# Patient Record
Sex: FEMALE | Race: WHITE | NOT HISPANIC OR LATINO | Employment: FULL TIME | URBAN - METROPOLITAN AREA
[De-identification: names, ages, dates, MRNs, and addresses within clinical notes are randomized per-mention and may not be internally consistent; named-entity substitution may affect disease eponyms.]

---

## 2017-05-31 ENCOUNTER — TELEPHONE (OUTPATIENT)
Dept: NURSING | Facility: CLINIC | Age: 37
End: 2017-05-31

## 2017-05-31 NOTE — TELEPHONE ENCOUNTER
"W/o knowing what the virus is I'm not sure what to tell her. Usually viruses that are common in kids and \"just run its course\" are not concerning for a new pregnancy unless it's parvovirus (slapped cheek), measles, CMV. My guess is that her mari sores are causing change in latch d/t pain and a blister or split in her nipple occurred. Could certainly come in and be seen for eval but it's going to be very difficult to know regarding pregnancy if we don't know what the child's virus is. If preganncy is viable at 8 weeks it's usually a good sign b/c viruses this early in pregnancy are all or none in terms of effect. Usually no issues at all though  "

## 2017-05-31 NOTE — TELEPHONE ENCOUNTER
So that's cold sores that her child has. I don't think i've ever seen a cold sore on a nipple. Isn't impossible I suppose but very unlikely. The only way that hsv-1 can impact her baby is if she had a vaginal outbreak of herpes at time of delivery. Ow it's not of concern as up to 30% of people carry cold sore virus and chances are she herself does already

## 2017-05-31 NOTE — TELEPHONE ENCOUNTER
LMP 4/23/17. 5w3d.  Still nursing second child who delivered 2/13/16. Child seen by peds and has virus that has caused sores in mouth. Pt states not hand foot and mouth disease. Pt has now developed a sore on her left nipple that is now bleeding and is worried that virus could cause problems for her pregnancy. Pt has no other symptoms other than sore. Peds stated virus just had to run its course, no treatment. Routing to Dr. Silva. Please advise.

## 2017-05-31 NOTE — TELEPHONE ENCOUNTER
Pt informed. Pt called peds and was told the virus is Herpes simplex type 1. Pt was given the following suggests regarding the sore nipple  try mothers love cream, place breast milk on the nipple, air dry the nipple. Working the latch by using a different position to nurse and to pressure on a different area. Pt will call back if she wants to be seen. Routing to Dr. Silva for review.

## 2017-06-16 ENCOUNTER — PRENATAL OFFICE VISIT (OUTPATIENT)
Dept: OBGYN | Facility: CLINIC | Age: 37
End: 2017-06-16
Payer: COMMERCIAL

## 2017-06-16 ENCOUNTER — RADIANT APPOINTMENT (OUTPATIENT)
Dept: ULTRASOUND IMAGING | Facility: CLINIC | Age: 37
End: 2017-06-16
Attending: OBSTETRICS & GYNECOLOGY
Payer: COMMERCIAL

## 2017-06-16 VITALS
HEART RATE: 104 BPM | BODY MASS INDEX: 19.79 KG/M2 | DIASTOLIC BLOOD PRESSURE: 70 MMHG | SYSTOLIC BLOOD PRESSURE: 115 MMHG | HEIGHT: 69 IN | WEIGHT: 133.6 LBS

## 2017-06-16 DIAGNOSIS — R87.810 ASCUS WITH POSITIVE HIGH RISK HPV CERVICAL: ICD-10-CM

## 2017-06-16 DIAGNOSIS — Z34.91 UNCERTAIN DATES, ANTEPARTUM, FIRST TRIMESTER: ICD-10-CM

## 2017-06-16 DIAGNOSIS — Z36.9 ENCOUNTER FOR ANTENATAL SCREENING OF MOTHER: ICD-10-CM

## 2017-06-16 DIAGNOSIS — O09.521 HIGH-RISK PREGNANCY, ELDERLY MULTIGRAVIDA IN FIRST TRIMESTER: Primary | ICD-10-CM

## 2017-06-16 DIAGNOSIS — R87.610 ASCUS WITH POSITIVE HIGH RISK HPV CERVICAL: ICD-10-CM

## 2017-06-16 PROBLEM — O09.529 HIGH-RISK PREGNANCY, ELDERLY MULTIGRAVIDA: Status: ACTIVE | Noted: 2017-06-16

## 2017-06-16 LAB
ABO + RH BLD: NORMAL
ABO + RH BLD: NORMAL
ALBUMIN UR-MCNC: NEGATIVE MG/DL
APPEARANCE UR: CLEAR
BACTERIA #/AREA URNS HPF: ABNORMAL /HPF
BASOPHILS # BLD AUTO: 0 10E9/L (ref 0–0.2)
BASOPHILS NFR BLD AUTO: 0.3 %
BILIRUB UR QL STRIP: NEGATIVE
BLD GP AB SCN SERPL QL: NORMAL
BLOOD BANK CMNT PATIENT-IMP: NORMAL
COLOR UR AUTO: YELLOW
DIFFERENTIAL METHOD BLD: NORMAL
EOSINOPHIL # BLD AUTO: 0.1 10E9/L (ref 0–0.7)
EOSINOPHIL NFR BLD AUTO: 1.5 %
ERYTHROCYTE [DISTWIDTH] IN BLOOD BY AUTOMATED COUNT: 12.5 % (ref 10–15)
GLUCOSE UR STRIP-MCNC: NEGATIVE MG/DL
HCT VFR BLD AUTO: 36.1 % (ref 35–47)
HGB BLD-MCNC: 12.1 G/DL (ref 11.7–15.7)
HGB UR QL STRIP: ABNORMAL
KETONES UR STRIP-MCNC: NEGATIVE MG/DL
LEUKOCYTE ESTERASE UR QL STRIP: NEGATIVE
LYMPHOCYTES # BLD AUTO: 1.7 10E9/L (ref 0.8–5.3)
LYMPHOCYTES NFR BLD AUTO: 19.1 %
MCH RBC QN AUTO: 30.3 PG (ref 26.5–33)
MCHC RBC AUTO-ENTMCNC: 33.5 G/DL (ref 31.5–36.5)
MCV RBC AUTO: 90 FL (ref 78–100)
MONOCYTES # BLD AUTO: 0.6 10E9/L (ref 0–1.3)
MONOCYTES NFR BLD AUTO: 6.2 %
MUCOUS THREADS #/AREA URNS LPF: PRESENT /LPF
NEUTROPHILS # BLD AUTO: 6.5 10E9/L (ref 1.6–8.3)
NEUTROPHILS NFR BLD AUTO: 72.9 %
NITRATE UR QL: NEGATIVE
NON-SQ EPI CELLS #/AREA URNS LPF: ABNORMAL /LPF
PH UR STRIP: 6 PH (ref 5–7)
PLATELET # BLD AUTO: 272 10E9/L (ref 150–450)
RBC # BLD AUTO: 4 10E12/L (ref 3.8–5.2)
RBC #/AREA URNS AUTO: ABNORMAL /HPF (ref 0–2)
SP GR UR STRIP: 1.02 (ref 1–1.03)
SPECIMEN EXP DATE BLD: NORMAL
URN SPEC COLLECT METH UR: ABNORMAL
UROBILINOGEN UR STRIP-ACNC: 0.2 EU/DL (ref 0.2–1)
WBC # BLD AUTO: 8.9 10E9/L (ref 4–11)
WBC #/AREA URNS AUTO: ABNORMAL /HPF (ref 0–2)

## 2017-06-16 PROCEDURE — 87389 HIV-1 AG W/HIV-1&-2 AB AG IA: CPT | Performed by: OBSTETRICS & GYNECOLOGY

## 2017-06-16 PROCEDURE — 86901 BLOOD TYPING SEROLOGIC RH(D): CPT | Performed by: OBSTETRICS & GYNECOLOGY

## 2017-06-16 PROCEDURE — 81001 URINALYSIS AUTO W/SCOPE: CPT | Performed by: OBSTETRICS & GYNECOLOGY

## 2017-06-16 PROCEDURE — 86762 RUBELLA ANTIBODY: CPT | Performed by: OBSTETRICS & GYNECOLOGY

## 2017-06-16 PROCEDURE — 87340 HEPATITIS B SURFACE AG IA: CPT | Performed by: OBSTETRICS & GYNECOLOGY

## 2017-06-16 PROCEDURE — 86780 TREPONEMA PALLIDUM: CPT | Performed by: OBSTETRICS & GYNECOLOGY

## 2017-06-16 PROCEDURE — 86850 RBC ANTIBODY SCREEN: CPT | Performed by: OBSTETRICS & GYNECOLOGY

## 2017-06-16 PROCEDURE — 87624 HPV HI-RISK TYP POOLED RSLT: CPT | Performed by: OBSTETRICS & GYNECOLOGY

## 2017-06-16 PROCEDURE — 76817 TRANSVAGINAL US OBSTETRIC: CPT | Performed by: OBSTETRICS & GYNECOLOGY

## 2017-06-16 PROCEDURE — 85025 COMPLETE CBC W/AUTO DIFF WBC: CPT | Performed by: OBSTETRICS & GYNECOLOGY

## 2017-06-16 PROCEDURE — 99207 ZZC FIRST OB VISIT: CPT | Performed by: OBSTETRICS & GYNECOLOGY

## 2017-06-16 PROCEDURE — 88141 CYTOPATH C/V INTERPRET: CPT | Performed by: OBSTETRICS & GYNECOLOGY

## 2017-06-16 PROCEDURE — 86900 BLOOD TYPING SEROLOGIC ABO: CPT | Performed by: OBSTETRICS & GYNECOLOGY

## 2017-06-16 PROCEDURE — 88175 CYTOPATH C/V AUTO FLUID REDO: CPT | Performed by: OBSTETRICS & GYNECOLOGY

## 2017-06-16 PROCEDURE — 87086 URINE CULTURE/COLONY COUNT: CPT | Performed by: OBSTETRICS & GYNECOLOGY

## 2017-06-16 PROCEDURE — 36415 COLL VENOUS BLD VENIPUNCTURE: CPT | Performed by: OBSTETRICS & GYNECOLOGY

## 2017-06-16 ASSESSMENT — PATIENT HEALTH QUESTIONNAIRE - PHQ9: 5. POOR APPETITE OR OVEREATING: NOT AT ALL

## 2017-06-16 ASSESSMENT — ANXIETY QUESTIONNAIRES
3. WORRYING TOO MUCH ABOUT DIFFERENT THINGS: NOT AT ALL
6. BECOMING EASILY ANNOYED OR IRRITABLE: NOT AT ALL
7. FEELING AFRAID AS IF SOMETHING AWFUL MIGHT HAPPEN: NOT AT ALL
2. NOT BEING ABLE TO STOP OR CONTROL WORRYING: NOT AT ALL
GAD7 TOTAL SCORE: 0
1. FEELING NERVOUS, ANXIOUS, OR ON EDGE: NOT AT ALL
5. BEING SO RESTLESS THAT IT IS HARD TO SIT STILL: NOT AT ALL

## 2017-06-16 NOTE — NURSING NOTE
[facility] Obstetrical Risk History    Patient presents for new OB labs and teaching.      1. Please indicate any condition you have or have had in the past:  Herpes  2. Do you smoke?  No  If yes, how many packs/day?   3. Do you drink alcoholic beverages? No  If yes, how often?  What type?   4. List any medications taken since your last period: Prenatal Vitamin  5. List any recreational drugs (cocaine, marijuana, etc. used since your last period:None    6. List any chemical or radiation exposure that you've encountered: None  7. Are you on a restricted diet? No  If yes, please describe:  Do you have any Taoism objections to any form of treatment? No      GENETIC SCREENING    These questions apply to you, the baby's father, or anyone in either family with:    1. Patient's age 35 or greater at delivery? Yes  2. Surinamese, Syriac, Mediterranean ancestry? No  3. Neural Tube Defect (Meningomyelocele, Spina Bifida, or Anencephaly)? No  4. Tenriism, Hungarian Barbadian or history of Shawn-Sachs disease? No  5. Down's Syndrome?   No  6. Hemophilia or clotting disorder? No  7. Muscular Dystrophy? No  8. Cystic Fibrosis? No  9. [place]'s Chorea? No  10. Mental Retardation? No  11. 3 or more miscarriages or a stillborn? No  12. Other inherited disease or chromosomal disorder? No  13. Have you or the baby's father had a child born with a birth defect? No  14. Did you or the baby's father have a birth defect yourselves? No    Do you have any other concerns about birth defects? No      Prior to finding out is pregnant went to the chiropractor and had STEM treatment done twice.      -Third pregnancy; is currently breastfeeding.  -Pt did get a flu shot this season.  -Last annual exam was her postpartum visit which was on 3/29/2016.  -Talked pt and her  about genetic testing/genetic screening. Gave Innatal brochure and discussed what it is. Pt aware to get done Innatal done has to be a minimum of 10 wks pregnant. Pt did  not indicate if she wanted to have Innatal done or not.

## 2017-06-16 NOTE — PROGRESS NOTES
This is a 36 year old female patient,   who presents for her first obstetrical visit.    Patient's last menstrual period was 2017 (exact date)..  This gives her an EDC of 2018 .  She is 7w5d weeks.  Her cycles are irregular.  Her last menstrual period was normal.  She has not had an ultrasound prior to the visit..  Since her LMP, she has experienced  nausea, emesis and fatigue).  She denies abdominal pain, headache, loss of appetite, vaginal discharge, dysuria, pelvic pain, urinary urgency, lightheadedness, urinary frequency, vaginal bleeding, hemorrhoids and constipation.    Prior pregs uncomplicated. Emesis qday.       Past History:  Her past medical history   Past Medical History:   Diagnosis Date     History of cold sores     Orally only. Did have one on her breast as well.     HPV (human papilloma virus) infection      Threatened       Vaginal Pap smear with LGSIL      Vaginitis    .      Her past pregnancies have been uncomplicated.    Since her last LMP she denies use of alcohol, tobacco and street drugs.    HISTORY:  Obstetric History       T2      L2     SAB0   TAB0   Ectopic0   Multiple0   Live Births1       # Outcome Date GA Lbr Reggie/2nd Weight Sex Delivery Anes PTL Lv   3 Current            2 Term 16 37w6d 03:27 / 00:15 8 lb 3.9 oz (3.74 kg) F   N MATTHEW      Apgar1:  8                Apgar5: 9   1 Term 14 39w0d 07:15 / 05:12 6 lb 13.4 oz (3.1 kg) F Vag-Spont EPI  MATTHEW      Name: LANA DONAHUE      Apgar1:  9                Apgar5: 9        Past Medical History:   Diagnosis Date     History of cold sores     Orally only. Did have one on her breast as well.     HPV (human papilloma virus) infection      Threatened       Vaginal Pap smear with LGSIL      Vaginitis      Past Surgical History:   Procedure Laterality Date     COLPOSCOPY CERVIX, BIOPSY CERVIX, ENDOCERVICAL CURETTAGE, COMBINED  2012    bx and ECG neg after initial LGSIL ,  "+ HPV     HC REMOVAL OF OVARIAN CYST(S)  1996     LAPAROSCOPIC APPENDECTOMY CHILD  2000     wisdom teeth       Family History   Problem Relation Age of Onset     Hyperlipidemia Father      Parkinsonism Father      Hyperlipidemia Maternal Grandfather      Hyperlipidemia Paternal Grandmother      Hypertension Paternal Grandmother      Social History     Social History     Marital status:      Spouse name: Ashley     Number of children: 1     Years of education: N/A     Social History Main Topics     Smoking status: Never Smoker     Smokeless tobacco: Never Used     Alcohol use No     Drug use: None     Sexual activity: Yes     Partners: Male     Birth control/ protection: None      Comment: premenopausal, LMP 5-     Other Topics Concern     None     Social History Narrative     Current Outpatient Prescriptions   Medication Sig     Prenatal Vit-Fe Fumarate-FA (PRENATAL MULTIVITAMIN  PLUS IRON) 27-0.8 MG TABS Take 1 tablet by mouth daily     No current facility-administered medications for this visit.      Facility-Administered Medications Ordered in Other Visits   Medication     ROPivacaine (NAROPIN) epidural     fentaNYL (SUBLIMAZE) injection     No Known Allergies    Past medical, surgical, social and family history were reviewed and updated in EPIC.    ROS:   12 point review of systems negative other than symptoms noted below.  Constitutional: Fatigue  Gastrointestinal: Nausea and Vomiting    EXAM:  /70  Pulse 104  Ht 5' 8.5\" (1.74 m)  Wt 133 lb 9.6 oz (60.6 kg)  LMP 04/23/2017 (Exact Date)  Breastfeeding? Yes  BMI 20.02 kg/m2   BMI: Body mass index is 20.02 kg/(m^2).    EXAM:  Constitutional:  Appearance: Well nourished, well developed alert, in no acute distress.  Neck:   Lymph Nodes:  No lymphadenopathy present.    Thyroid:  Gland size normal, nontender, no nodules or masses present  on palpation.  Chest:  Respiratory Effort:  Breathing unlabored.  Cardiovascular:  Heart Auscultation:  " Regular rate, normal rhythm, no murmurs    present.  Breasts: Deferred.    Axillary Lymph Nodes:  No lymphadenopathy present.  Gastrointestinal:  Abdominal Examination:  Abdomen nontender to palpation, tone  normal without rigidity or guarding, no masses present, umbilicus without  Lesions.    Liver and speen:  No hepatomegaly present, liver nontender to  palpation.    Hernias:  No hernias present.  Lymphatic: Lymph Nodes:  No other lymphadenopathy present.  Skin:  General Inspection:  No rashes present, no lesions present, no areas of  discoloration.    Genitalia and Groin:  No rashes present, no lesions present, no areas of  discoloration, no masses present.  Neurologic/Psychiatric:    Mental Status:  Oriented X3.    Pelvic Exam:  External Genitalia:     Normal appearance for age, no discharge present, no tenderness present, no inflammatory lesions present, color normal  Vagina:     Normal vaginal vault without central or paravaginal defects, no discharge present, no inflammatory lesions present, no masses present  Bladder:     Nontender to palpation  Urethra:   Urethral Body:  Urethra palpation normal, urethra structural support normal   Urethral Meatus:  No erythema or lesions present  Cervix:     Appearance healthy, no lesions present, nontender to palpation, no bleeding present  Uterus:     Uterus: firm, normal sized and nontender, retroverted in position.   Adnexa:     No adnexal tenderness present, no adnexal masses present  Perineum:     Perineum within normal limits, no evidence of trauma, no rashes or skin lesions present  Anus:     Anus within normal limits, no hemorrhoids present  Inguinal Lymph Nodes:     No lymphadenopathy present  Pubic Hair:     Normal pubic hair distribution for age  Genitalia and Groin:     No rashes present, no lesions present, no areas of discoloration, no masses present  Bedside u/s shows single IUP at 6+4 weeks.     ASSESSMENT:      ICD-10-CM    1. High-risk pregnancy, elderly  multigravida in first trimester O09.521    2. Encounter for  screening of mother Z36 UA with Microscopic     ABO/Rh type and screen     Anti Treponema     CBC with platelets differential     Hepatitis B surface antigen     HIV Antigen Antibody Combo     Rubella Antibody IgG Quantitative     Urine Culture Aerobic Bacterial   3. Uncertain dates, antepartum, first trimester Z34.91 US OB Transvaginal Only   4. ASCUS with positive high risk HPV cervical R87.610 Pap imaged thin layer screen with HPV - recommended age 30 - 65    R87.810 HPV High Risk Types DNA Cervical       PLAN/PATIENT INSTRUCTIONS:    Will decide about Genetic testing.   Get formal u/s to determine EDC.    Javi Pierson MD

## 2017-06-16 NOTE — MR AVS SNAPSHOT
After Visit Summary   2017    Ashlee Acevedo    MRN: [id number]           Patient Information     Date Of Birth          1980        Visit Information        Provider Department      2017 8:30 AM Javi Pierson MD; [facility]        Today's Diagnoses     High-risk pregnancy, elderly multigravida in first trimester    -  1    Encounter for  screening of mother        Uncertain dates, antepartum, first trimester        ASCUS with positive high risk HPV cervical           Follow-ups after your visit        Your next 10 appointments already scheduled     2017  9:45 AM CDT   US OB TRANSVAGINAL ONLY with WEUS2   [facility] ([facility]    [address] [place] [id number] [phone]           Please bring a list of your medicines (including vitamins, minerals and over-the-counter drugs). Also, tell your doctor about any allergies you may have. Wear comfortable clothes and leave your valuables at home.  If you re less than 20 weeks drink four 8-ounce glasses of fluid an hour before your exam. If you need to empty your bladder before your exam, try to release only a little urine. Then, drink another glass of fluid.  You may have up to two family members in the exam room. If you bring a small child, an adult must be there to care for him or her.  Please call the Imaging Department at your exam site with any questions.              Who to contact     If you have questions or need follow up information about today's clinic visit or your schedule please contact [facility] directly at [phone].  Normal or non-critical lab and imaging results will be communicated to you by MyChart, letter or phone within 4 business days after the clinic has received the results. If you do not hear from us within 7 days, please contact the clinic through MyChart or phone.  "If you have a critical or abnormal lab result, we will notify you by phone as soon as possible.  Submit refill requests through [employer] or call your pharmacy and they will forward the refill request to us. Please allow 3 business days for your refill to be completed.          Additional Information About Your Visit        [employer] Information     [employer] lets you send messages to your doctor, view your test results, renew your prescriptions, schedule appointments and more. To sign up, go to [email or web address] . Click on \"Log in\" on the left side of the screen, which will take you to the Welcome page. Then click on \"Sign up Now\" on the right side of the page.     You will be asked to enter the access code listed below, as well as some personal information. Please follow the directions to create your username and password.     Your access code is: [email or web address]  Expires: 2017  9:37 AM     Your access code will  in 90 days. If you need help or a new code, please call your [facility] clinic or [phone].        Care EveryWhere ID     This is your Care EveryWhere ID. This could be used by other organizations to access your [facility] medical records  [id number]        Your Vitals Were     Pulse Height Last Period Breastfeeding? BMI (Body Mass Index)       104 5' 8.5\" (1.74 m) 2017 (Exact Date) Yes 20.02 kg/m2        Blood Pressure from Last 3 Encounters:   17 115/70   16 98/62   16 100/54    Weight from Last 3 Encounters:   17 133 lb 9.6 oz (60.6 kg)   16 144 lb (65.3 kg)   16 146 lb (66.2 kg)              We Performed the Following     ABO/Rh type and screen     Anti Treponema     CBC with platelets differential     Hepatitis B surface antigen     HIV Antigen Antibody Combo     HPV High Risk Types DNA Cervical     Pap imaged thin layer screen with HPV - recommended age 30 - 65     Rubella Antibody IgG Quantitative     UA with Microscopic     Urine Culture Aerobic " [facility]        Primary Care Provider Office Phone # Fax #    Javi Pierson [phone]       [facility] [address] [place] [id number]        Thank you!     Thank you for choosing [facility]  for your care. Our goal is always to provide you with excellent care. Hearing back from our patients is one way we can continue to improve our services. Please take a few minutes to complete the written survey that you may receive in the mail after your visit with us. Thank you!             Your Updated Medication List - Protect others around you: Learn how to safely use, store and throw away your medicines at www.disposemymeds.org.          This list is accurate as of: 6/16/17  9:37 AM.  Always use your most recent med list.                   Brand Name Dispense Instructions for use    prenatal multivitamin  plus iron 27-0.8 MG Tabs per tablet     100 tablet    Take 1 tablet by mouth daily

## 2017-06-17 LAB
BACTERIA SPEC CULT: NO GROWTH
Lab: NORMAL
MICRO REPORT STATUS: NORMAL
SPECIMEN SOURCE: NORMAL
T PALLIDUM IGG+IGM SER QL: NEGATIVE

## 2017-06-17 ASSESSMENT — PATIENT HEALTH QUESTIONNAIRE - PHQ9: SUM OF ALL RESPONSES TO PHQ QUESTIONS 1-9: 0

## 2017-06-17 ASSESSMENT — ANXIETY QUESTIONNAIRES: GAD7 TOTAL SCORE: 0

## 2017-06-19 LAB
HBV SURFACE AG SERPL QL IA: NONREACTIVE
HIV 1+2 AB+HIV1 P24 AG SERPL QL IA: NORMAL
RUBV IGG SERPL IA-ACNC: 16 IU/ML

## 2017-06-21 LAB
COPATH REPORT: ABNORMAL
PAP: ABNORMAL

## 2017-06-22 LAB
FINAL DIAGNOSIS: ABNORMAL
HPV HR 12 DNA CVX QL NAA+PROBE: POSITIVE
HPV16 DNA SPEC QL NAA+PROBE: NEGATIVE
HPV18 DNA SPEC QL NAA+PROBE: NEGATIVE
SPECIMEN DESCRIPTION: ABNORMAL

## 2017-06-23 ENCOUNTER — RESULT FOLLOW UP (OUTPATIENT)
Dept: OBGYN | Facility: CLINIC | Age: 37
End: 2017-06-23

## 2017-06-23 DIAGNOSIS — R87.610 ASCUS WITH POSITIVE HIGH RISK HPV CERVICAL: ICD-10-CM

## 2017-06-23 DIAGNOSIS — R87.810 ASCUS WITH POSITIVE HIGH RISK HPV CERVICAL: ICD-10-CM

## 2017-06-23 NOTE — PROGRESS NOTES
03/29/16 ASCUS, +HR HPV.   04/07/16 [facility] -JENIFFER 1. Plan 1 yr co-test  06/16/17 ASCUS, +HR HPV, pregnant. Plan repeat pap post partum, due date 02/06/18 06/26/17: I called the pt and informed her of the pap and HPV results and to repeat this 6 weeks post partum per her provider. (sk)  3/20/18 NIL pap/+ HR HPV (not 16/18). Plan: colposcopy by 6/20/18 (hospitals)  3/26/18 advised of result and follow up (hospitals)  05/23/18 [facility] reminder letter sent. ([facility])  6/22/18 Colpo- Normal ECC. Plan: pap and HPV in a year. (hospitals)  06/07/19 [facility] reminder letter sent. ([facility])  6/24/19 NIL pap, + HR HPV (not 16/18).  Plan: colpo (hospitals)  7/1/19 left msg/Advised of result and follow up. (hospitals)  9/6/19 Colpo: ECC-neg.  Plan: [facility] in 1 year (hospitals)

## 2017-06-23 NOTE — LETTER
June 7, 2019      Ashlee Acevedo  [address] [address]    Dear ,      At [facility], your health and wellness is our primary concern. That is why we are following up on a colposcopy from 06/22/18. Your provider had recommended that you have a Pap smear and HPV test completed by 06/22/19. Our records do not show that this has been scheduled.    It is important to complete the follow up that your provider has suggested for you to ensure that there are no worsening changes which may, over time, develop into cancer.      Please contact our office at  [phone] to schedule an appointment for a Pap smear and HPV test at your earliest convenience. If you have questions or concerns, please call the clinic and we will be happy to assist you.    If you have completed the tests outside of [facility], please have the results forwarded to our office. We will update the chart for your primary Physician to review before your next annual physical.     Thank you for choosing [facility]!    Sincerely,      Your [facility] Care Team/[facility]

## 2017-06-23 NOTE — LETTER
May 23, 2018      Ashlee Acevedo  [address] [address]    Dear MsCorazon,      At [facility], your health and wellness is our primary concern. That is why we are following up on a positive high risk HPV test from 03/20/18. Your provider had recommended that you have a Colposcopy completed by 06/20/18. Our records do not show that this has been scheduled.    It is important to complete the follow up that your provider has suggested for you to ensure that there are no worsening changes which may, over time, develop into cancer.      Please contact our office at  [phone] to schedule an appointment for a Colposcopy at your earliest convenience. If you have questions or concerns, please call the clinic and we will be happy to assist you.    If you have completed the tests outside of [facility], please have the results forwarded to our office. We will update the chart for your primary Physician to review before your next annual physical.     Thank you for choosing [facility]!    Sincerely,      Javi Pierson MD/[facility]

## 2017-06-27 ENCOUNTER — TELEPHONE (OUTPATIENT)
Dept: OBGYN | Facility: CLINIC | Age: 37
End: 2017-06-27

## 2017-06-27 NOTE — TELEPHONE ENCOUNTER
Is about 8 wks - would like to discuss morning sickness she is having.  Please call.  She is still nursing her 1 year old, wants to know what is safe to take.

## 2017-06-27 NOTE — TELEPHONE ENCOUNTER
Spoke with Dr. Pierson who states vit B6/unisom or zofran is an option with BF. I also reviewed a lot of the non-medication options with patient, she would like to try these first. Patient does not even know how much milk the baby is getting as she has not let down in some time. She thinks it is just a comfort thing for her 16 mth old. Is aware of nutrition concerns with pregnancy and BF. She will call back if she continues not feeling well despite trying other options.

## 2017-07-02 ENCOUNTER — NURSE TRIAGE (OUTPATIENT)
Dept: NURSING | Facility: CLINIC | Age: 37
End: 2017-07-02

## 2017-07-03 ENCOUNTER — PRENATAL OFFICE VISIT (OUTPATIENT)
Dept: OBGYN | Facility: CLINIC | Age: 37
End: 2017-07-03
Payer: COMMERCIAL

## 2017-07-03 ENCOUNTER — TELEPHONE (OUTPATIENT)
Dept: NURSING | Facility: CLINIC | Age: 37
End: 2017-07-03

## 2017-07-03 VITALS — DIASTOLIC BLOOD PRESSURE: 70 MMHG | BODY MASS INDEX: 19.78 KG/M2 | SYSTOLIC BLOOD PRESSURE: 112 MMHG | WEIGHT: 132 LBS

## 2017-07-03 DIAGNOSIS — O21.9 NAUSEA AND VOMITING IN PREGNANCY: Primary | ICD-10-CM

## 2017-07-03 DIAGNOSIS — O09.521 HIGH-RISK PREGNANCY, ELDERLY MULTIGRAVIDA IN FIRST TRIMESTER: ICD-10-CM

## 2017-07-03 PROCEDURE — 99207 ZZC PRENATAL VISIT: CPT | Performed by: OBSTETRICS & GYNECOLOGY

## 2017-07-03 RX ORDER — ONDANSETRON 8 MG/1
8 TABLET, ORALLY DISINTEGRATING ORAL EVERY 8 HOURS PRN
Qty: 30 TABLET | Refills: 1 | Status: SHIPPED | OUTPATIENT
Start: 2017-07-03 | End: 2017-07-14

## 2017-07-03 NOTE — TELEPHONE ENCOUNTER
"8w6d; DAYO: 2/6/18  Called pt and states yesterday she was throwing up for 9 hrs straight. On-call yesterday (Dr. Pierson) stated it needed to run its course, per pt statement. Stopped throwing up last night at 10:00 PM. Woke up in middle of night and was sipping Gatorade. Today has been able to keep food down. Did not eat anything out of the ordinary. Gone to bathroom twice today. Both times urine was yellow and concentrated, first one in the morning was really dark yellow-\"almost orange.\" Denies dizziness/lightheadedhess, did have dry mouth in middle of night. Denies vaginal bleeding. Started this morning c/o mild, constant, menstrual like cramping, did have intercourse with the last two days. Also, c/o passing about quarter in size clear/white mucous.    Talked with Dr. Lobo and she recommended/gave verbal for Zofran 8 MG ODT, take 1 tab TID PRN, 30 tabs/1rf, it is a category C, we do prescribe it for patients but it has shown studies of things with baby. And seeing Dr. Pierson this wk to touch base. Not much we can do for the cramping, as long as she is not bleeding. Rx sent to pharmacy. Pt states she thinks she will hold off on Zofran for now as she is breastfeeding still. If she wants to pick it up she can, up to her. Discussed popsciles, ginger ale, jello. Reviewed if has vaginal bleeding, or if cramping intensifies, fever, chills, not able to tolerate food or liquid to let us know. Pt verbalized understanding.    Pt stated she wanted to be seen today for peace of mind and not waiting until later in wk. Appt available today at 3:50 PM with Dr. Lobo, scheduled appt.      Closing encounter.      "

## 2017-07-03 NOTE — MR AVS SNAPSHOT
"              After Visit Summary   7/3/2017    Ashlee Acevedo    MRN: [id number]           Patient Information     Date Of Birth          1980        Visit Information        Provider Department      7/3/2017 3:50 PM Shelli Lobo MD [facility]        Today's Diagnoses     High-risk pregnancy, elderly multigravida in first trimester           Follow-ups after your visit        Your next 10 appointments already scheduled     Jul 14, 2017  7:50 AM CDT   ESTABLISHED PRENATAL with Javi Pierson MD   [facility]    [address] [place] [id number] [phone]              Who to contact     If you have questions or need follow up information about today's clinic visit or your schedule please contact [facility] directly at [phone].  Normal or non-critical lab and imaging results will be communicated to you by MyChart, letter or phone within 4 business days after the clinic has received the results. If you do not hear from us within 7 days, please contact the clinic through MyChart or phone. If you have a critical or abnormal lab result, we will notify you by phone as soon as possible.  Submit refill requests through N[employer] or call your pharmacy and they will forward the refill request to us. Please allow 3 business days for your refill to be completed.          Additional Information About Your Visit        MyChart Information     [employer] lets you send messages to your doctor, view your test results, renew your prescriptions, schedule appointments and more. To sign up, go to [email or web address] . Click on \"Log in\" on the left side of the screen, which will take you to the Welcome page. Then click on \"Sign up Now\" on the right side of the page.     You will be asked to enter the access code listed below, as well as some personal information. Please follow the directions to " create your username and password.     Your access code is: [email or web address]  Expires: 2017  9:37 AM     Your access code will  in 90 days. If you need help or a new code, please call your [place] clinic or [phone].        Care EveryWhere ID     This is your Care EveryWhere ID. This could be used by other organizations to access your [place] medical records  [id number]        Your Vitals Were     Last Period BMI (Body Mass Index)                2017 (Exact Date) 19.78 kg/m2           Blood Pressure from Last 3 Encounters:   17 110/78   17 112/70   17 115/70    Weight from Last 3 Encounters:   17 132 lb (59.9 kg)   17 132 lb (59.9 kg)   17 133 lb 9.6 oz (60.6 kg)              Today, you had the following     No orders found for display         Today's Medication Changes          These changes are accurate as of: 7/3/17 11:59 PM.  If you have any questions, ask your nurse or doctor.               Start taking these medicines.        Dose/Directions    ondansetron 8 MG ODT tab   Commonly known as:  ZOFRAN-ODT   Used for:  Nausea and vomiting in pregnancy   Started by:  Javi Pierson MD        Dose:  8 mg   Take 1 tablet (8 mg) by mouth every 8 hours as needed for nausea   Quantity:  30 tablet   Refills:  1            Where to get your medicines      These medications were sent to [place] [address] [place] [address]  [address] [place] [address]     Phone:  [phone]     ondansetron 8 MG ODT tab                Primary Care Provider Office Phone # Fax #    Javi Pierson [phone]       [facility] [address] [address]        Equal Access to Services     [facility] YANELI AH: Siri burgesso Soanais, waaxda luqadaha, qaybta kaalmakarissa brownlee. [facility] [phone].    ATENCIÓN: Si jess adams, kita a abrams disposición  servicios gratuitos de asistencia lingüística. Len thomas [phone].    We comply with applicable federal civil rights laws and [place] laws. We do not discriminate on the basis of race, color, national origin, age, disability sex, sexual orientation or gender identity.            Thank you!     Thank you for choosing [facility] [place]  for your care. Our goal is always to provide you with excellent care. Hearing back from our patients is one way we can continue to improve our services. Please take a few minutes to complete the written survey that you may receive in the mail after your visit with us. Thank you!             Your Updated Medication List - Protect others around you: Learn how to safely use, store and throw away your medicines at www.disposemymeds.org.          This list is accurate as of: 7/3/17 11:59 PM.  Always use your most recent med list.                   Brand Name Dispense Instructions for use Diagnosis    ondansetron 8 MG ODT tab    ZOFRAN-ODT    30 tablet    Take 1 tablet (8 mg) by mouth every 8 hours as needed for nausea    Nausea and vomiting in pregnancy       prenatal multivitamin  plus iron 27-0.8 MG Tabs per tablet     100 tablet    Take 1 tablet by mouth daily

## 2017-07-03 NOTE — TELEPHONE ENCOUNTER
"Patient calling to report vomiting x 8 hours, not able to keep anything down. Vomiting at least 2 times an hour. Has had 3 diarrhea stools today. 8 weeks pregnant. Paged per clinic preferences.   Reason for Disposition    [1] SEVERE vomiting (e.g., 6 or more times/day) AND [2] present > 8 hours    Additional Information    Negative: Shock suspected (e.g., cold/pale/clammy skin, too weak to stand, low BP, rapid pulse)    Negative: Difficult to awaken or acting confused  (e.g., disoriented, slurred speech)    Negative: Sounds like a life-threatening emergency to the triager    Negative: Vomiting occurs only while coughing    Negative: [1] Pregnant < 20 Weeks AND [2] nausea/vomiting began in early pregnancy (i.e., 4-8 weeks pregnant)    Negative: Chest pain    Negative: [1] SEVERE headache AND [2] similar to prior migraines    Negative: [1] Vomiting AND [2] contains red blood or black (\"coffee ground\") material  (Exception: few red streaks in vomit that only happened once)    Negative: Severe pain in one eye    Negative: Recent head injury (within last 3 days)    Negative: Recent abdominal injury (within last 3 days)    Negative: [1] Insulin-dependent diabetes (Type I) AND [2] glucose > 400 mg/dl (22 mmol/l)    Protocols used: VOMITING-ADULT-    "

## 2017-07-05 ENCOUNTER — RADIANT APPOINTMENT (OUTPATIENT)
Dept: ULTRASOUND IMAGING | Facility: CLINIC | Age: 37
End: 2017-07-05
Payer: COMMERCIAL

## 2017-07-05 ENCOUNTER — PRENATAL OFFICE VISIT (OUTPATIENT)
Dept: OBGYN | Facility: CLINIC | Age: 37
End: 2017-07-05
Payer: COMMERCIAL

## 2017-07-05 VITALS — DIASTOLIC BLOOD PRESSURE: 78 MMHG | SYSTOLIC BLOOD PRESSURE: 110 MMHG | BODY MASS INDEX: 19.78 KG/M2 | WEIGHT: 132 LBS

## 2017-07-05 DIAGNOSIS — O36.80X0 ENCOUNTER TO DETERMINE FETAL VIABILITY OF PREGNANCY, NOT APPLICABLE OR UNSPECIFIED FETUS: ICD-10-CM

## 2017-07-05 DIAGNOSIS — O21.0 HYPEREMESIS AFFECTING PREGNANCY, ANTEPARTUM: Primary | ICD-10-CM

## 2017-07-05 PROCEDURE — 99207 ZZC PRENATAL VISIT: CPT | Performed by: OBSTETRICS & GYNECOLOGY

## 2017-07-05 PROCEDURE — 76817 TRANSVAGINAL US OBSTETRIC: CPT | Performed by: OBSTETRICS & GYNECOLOGY

## 2017-07-05 NOTE — PROGRESS NOTES
Patient seen last week due to anxiety about her pregnancy with a lot of nausea  Viability us repeated today and was normal so she is reassured  Red right eye with a lot of pain and light sensitivity, not itching    Needs eye md appt, to r/o iriritis since symptoms not typical for pink eye

## 2017-07-05 NOTE — MR AVS SNAPSHOT
"              After Visit Summary   7/5/2017    Ashlee Acevedo    MRN: [id number]           Patient Information     Date Of Birth          1980        Visit Information        Provider Department      7/5/2017 12:20 PM Shelli Lobo MD [facility]        Today's Diagnoses     Hyperemesis affecting pregnancy, antepartum    -  1       Follow-ups after your visit        Your next 10 appointments already scheduled     Jul 14, 2017  7:50 AM CDT   ESTABLISHED PRENATAL with Javi Pierson MD   [facility] ([facility]    [address] [place] [id number]   [phone]              Who to contact     If you have questions or need follow up information about today's clinic visit or your schedule please contact [facility] directly at [phone].  Normal or non-critical lab and imaging results will be communicated to you by MyChart, letter or phone within 4 business days after the clinic has received the results. If you do not hear from us within 7 days, please contact the clinic through MyChart or phone. If you have a critical or abnormal lab result, we will notify you by phone as soon as possible.  Submit refill requests through Loudr or call your pharmacy and they will forward the refill request to us. Please allow 3 business days for your refill to be completed.          Additional Information About Your Visit        MyChart Information     Loudr lets you send messages to your doctor, view your test results, renew your prescriptions, schedule appointments and more. To sign up, go to [email or web address] . Click on \"Log in\" on the left side of the screen, which will take you to the Welcome page. Then click on \"Sign up Now\" on the right side of the page.     You will be asked to enter the access code listed below, as well as some personal information. Please follow the directions to create your " username and password.     Your access code is: [email or web address]  Expires: 2017  9:37 AM     Your access code will  in 90 days. If you need help or a new code, please call your [place] clinic or [phone].        Care EveryWhere ID     This is your Care EveryWhere ID. This could be used by other organizations to access your [place] medical records  [id number]        Your Vitals Were     Last Period BMI (Body Mass Index)                2017 (Exact Date) 19.78 kg/m2           Blood Pressure from Last 3 Encounters:   17 110/78   17 112/70   17 115/70    Weight from Last 3 Encounters:   17 132 lb (59.9 kg)   17 132 lb (59.9 kg)   17 133 lb 9.6 oz (60.6 kg)              Today, you had the following     No orders found for display       Primary Care Provider Office Phone # Fax #    Javi Todd Pierson [phone]       [facility] WOMEN [address] [place] [address] [address]  [facility] [address]        Equal Access to Services     [facility]: Hadii aad ku hadasho Soomaali, waaxda luqadaha, qaybta kaalmada adeegyada, karissa jacobsenn vick uribe . So [facility] [phone].    ATENCIÓN: Si habla español, tiene a abrams disposición servicios gratuitos de asistencia lingüística. Llame al [phone].    We comply with applicable federal civil rights laws and [place] laws. We do not discriminate on the basis of race, color, national origin, age, disability sex, sexual orientation or gender identity.            Thank you!     Thank you for choosing [facility]  for your care. Our goal is always to provide you with excellent care. Hearing back from our patients is one way we can continue to improve our services. Please take a few minutes to complete the written survey that you may receive in the mail after your visit with us. Thank you!             Your Updated Medication List - Protect others around you: Learn how to safely use, store  and throw away your medicines at www.disposemymeds.org.          This list is accurate as of: 7/5/17 12:42 PM.  Always use your most recent med list.                   Brand Name Dispense Instructions for use Diagnosis    ondansetron 8 MG ODT tab    ZOFRAN-ODT    30 tablet    Take 1 tablet (8 mg) by mouth every 8 hours as needed for nausea    Nausea and vomiting in pregnancy       prenatal multivitamin  plus iron 27-0.8 MG Tabs per tablet     100 tablet    Take 1 tablet by mouth daily

## 2017-07-06 ENCOUNTER — TELEPHONE (OUTPATIENT)
Dept: OBGYN | Facility: CLINIC | Age: 37
End: 2017-07-06

## 2017-07-06 NOTE — TELEPHONE ENCOUNTER
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 Eye doctor called Dr. Lobo and indicated that Valtrex was ok to take. Pt was then prescribed Valtrex 500 MG BID for 10 days.   Pt took her first dose of Valtrex last night and within 30 minutes started feeling really dizzy and then ended up getting sick and she did take it with dinner and 24 oz of water. The Opthamlologist stated some people can have dizziness. Pt did not take a dose this morning and eye is feeling a lot better. Pt called the eye doctor to see if there are any Rx's to treat HSV without side effects and he indicated there is a topical treatment but wants the OB to state what it is.  Talked with Dr. Lobo and she indicated that if the eye doctor says it's ok she's fine with it but that she is not writing the order. The eye is not her specialty.     Called pt and LM on VM (PHI on consent) informing of Dr. Lobo's message. Recommended once she finds out the name of the Rx that she call us and let us know and we'll look it up to see what the category is in pregnancy.

## 2017-07-06 NOTE — TELEPHONE ENCOUNTER
Reason for call:  Patient reporting a symptom  Symptom or request: patient's Opthamologist spoke to  about eye drops for patient to take, but they made her sick Patient is wondering about other meds to take? Pt is 9 wks pregnant    Duration (how long have symptoms been present): a few days    Have you been treated for this before? No    Additional comments: please call patient today    Phone Number patient can be reached at:  Cell number on file:    Telephone Information:   Mobile [phone]       Best Time:  anytime    Can we leave a detailed message on this number:  YES    Call taken on 7/6/2017 at 2:05 PM by Leslie Hanna

## 2017-07-06 NOTE — PROGRESS NOTES
Patient was anxious and wanted to come in to discuss  No bleeding but had some   I suggested we repeat us to make her feel better but can't be done late in day since ultrasound is gone already   Patient to return Wed  Reassured her she already had normal ultrasound and has no bleeding so Im not worried

## 2017-07-07 NOTE — TELEPHONE ENCOUNTER
Pt calling back and states they gave her another Rx to consider: Zirgan (Ganciclovir) .15% apply 5 times a day to the eye directly, but this Rx can be systemic. Was not told how long she would use it for. Pt wondering if she should stay on the Valtrex even if it makes her more sick to try and treat it. The Ganciclovir is a category C.    Dr. Lobo was addressing this as she was the one who spoke with the eye doctor but Dr. Lobo out of office today. Routing to Dr. Pierson (pt's primary OB) to review and advise.

## 2017-07-07 NOTE — TELEPHONE ENCOUNTER
Called pt and informed her of Dr. Pierson's message below. Informed her that however long the eye doctor prescribes to use it that way as they are the specialists but that Dr. Pierson indicated it is fine for usage. Pt verbalized understanding.        Closing encounter.

## 2017-07-14 ENCOUNTER — PRENATAL OFFICE VISIT (OUTPATIENT)
Dept: OBGYN | Facility: CLINIC | Age: 37
End: 2017-07-14
Payer: COMMERCIAL

## 2017-07-14 ENCOUNTER — TRANSFERRED RECORDS (OUTPATIENT)
Dept: HEALTH INFORMATION MANAGEMENT | Facility: CLINIC | Age: 37
End: 2017-07-14

## 2017-07-14 VITALS — DIASTOLIC BLOOD PRESSURE: 60 MMHG | SYSTOLIC BLOOD PRESSURE: 112 MMHG | BODY MASS INDEX: 20.14 KG/M2 | WEIGHT: 134.4 LBS

## 2017-07-14 DIAGNOSIS — O09.521 HIGH-RISK PREGNANCY, ELDERLY MULTIGRAVIDA IN FIRST TRIMESTER: ICD-10-CM

## 2017-07-14 PROCEDURE — 99207 ZZC PRENATAL VISIT: CPT | Performed by: OBSTETRICS & GYNECOLOGY

## 2017-07-14 PROCEDURE — 40000791 ZZHCL STATISTIC VERIFI PRENATAL TRISOMY 21,18,13: Mod: 90 | Performed by: OBSTETRICS & GYNECOLOGY

## 2017-07-14 PROCEDURE — 99000 SPECIMEN HANDLING OFFICE-LAB: CPT | Performed by: OBSTETRICS & GYNECOLOGY

## 2017-07-14 PROCEDURE — 36415 COLL VENOUS BLD VENIPUNCTURE: CPT | Performed by: OBSTETRICS & GYNECOLOGY

## 2017-07-14 NOTE — PROGRESS NOTES
Eye all better. Never used meds  Feeling better. Nausea but no emesis.  Plans Innatal today  RTC 5 weeks with AFP

## 2017-07-14 NOTE — MR AVS SNAPSHOT
"              After Visit Summary   2017    Ashlee Acevdeo    MRN: [id number]           Patient Information     Date Of Birth          1980        Visit Information        Provider Department      2017 7:50 AM Javi Pierson MD [facility]        Today's Diagnoses     High-risk pregnancy, elderly multigravida in first trimester           Follow-ups after your visit        Who to contact     If you have questions or need follow up information about today's clinic visit or your schedule please [facility] directly at [phone].  Normal or non-critical lab and imaging results will be communicated to you by Arecont Visionhart, letter or phone within 4 business days after the clinic has received the results. If you do not hear from us within 7 days, please contact the clinic through Are[employer] Vision[employer] or phone. If you have a critical or abnormal lab result, we will notify you by phone as soon as possible.  Submit refill requests through Perm[employer] or call your pharmacy and they will forward the refill request to us. Please allow 3 business days for your refill to be completed.          Additional Information About Your Visit        MyChart Information     [employer] lets you send messages to your doctor, view your test results, renew your prescriptions, schedule appointments and more. To sign up, go to [email or web address] . Click on \"Log in\" on the left side of the screen, which will take you to the Welcome page. Then click on \"Sign up Now\" on the right side of the page.     You will be asked to enter the access code listed below, as well as some personal information. Please follow the directions to create your username and password.     Your access code is: [email or web address]  Expires: 2017  9:37 AM     Your access code will  in 90 days. If you need help or a new code, please call your [place] clinic or [phone].        Care EveryWhere ID     This is your Care " EveryWhere ID. This could be used by other organizations to access your [place] medical records  [id number]        Your Vitals Were     Last Period BMI (Body Mass Index)                04/23/2017 (Exact Date) 20.14 kg/m2           Blood Pressure from Last 3 Encounters:   07/14/17 112/60   07/05/17 110/78   07/03/17 112/70    Weight from Last 3 Encounters:   07/14/17 134 lb 6.4 oz (61 kg)   07/05/17 132 lb (59.9 kg)   07/03/17 132 lb (59.9 kg)              We Performed the Following     Non Invasive Prenatal Test Cell Free DNA        Primary Care Provider Office Phone # Fax #    Javi Pierson [phone]       [facility] [address] [address]        Equal Access to Services     [facility] : Hadii andre burgesso Soanais, waaxda luqadaha, qaybta kaalmada aldo, karissa uribe . So [facility] [phone].    ATENCIÓN: Si habla español, tiene a abrams disposición servicios gratuitos de asistencia lingüística. Len al [phone].    We comply with applicable federal civil rights laws and [place] laws. We do not discriminate on the basis of race, color, national origin, age, disability sex, sexual orientation or gender identity.            Thank you!     [facility]  [facility] your care. Our goal is always to provide you with excellent care. Hearing back from our patients is one way we can continue to improve our services. Please take a few minutes to complete the written survey that you may receive in the mail after your visit with us. Thank you!             Your Updated Medication List - Protect others around you: Learn how to safely use, store and throw away your medicines at www.disposemymeds.org.          This list is accurate as of: 7/14/17  8:31 AM.  Always use your most recent med list.                   Brand Name Dispense Instructions for use Diagnosis    prenatal multivitamin  plus iron  27-0.8 MG Tabs per tablet     100 tablet    Take 1 tablet by mouth daily

## 2017-07-27 ENCOUNTER — TELEPHONE (OUTPATIENT)
Dept: NURSING | Facility: CLINIC | Age: 37
End: 2017-07-27

## 2017-07-27 DIAGNOSIS — O09.521 HIGH-RISK PREGNANCY, ELDERLY MULTIGRAVIDA IN FIRST TRIMESTER: ICD-10-CM

## 2017-07-27 NOTE — TELEPHONE ENCOUNTER
Innatal results: Negative    Test    Result     Interpretation  Chromosome 21  No aneuploidy detected     Results consistent with two copies of chromosome 21  Chromosome 18  No aneuploidy detected  Results consistent with two copies of chromosome 18  Chromosome 13  No aneuploidy detected  Results consistent with two copies of chromosome 13  Sex Chromosome  No aneuploidy detected  Results consistent with two sex chromosomes (XX) Female      Called pt, verified name and  and informed her of baby's negative screening results. Per pt request gender was revealed over the telephone. Pt verbalized understanding of results and has no questions. Updated OB Sticky Note and Problem Visit.    Results have already been scanned into pt's chart.

## 2017-07-31 LAB — NON INVASIVE PRENATAL TEST CELL FREE DNA: NORMAL

## 2017-08-10 ENCOUNTER — PRENATAL OFFICE VISIT (OUTPATIENT)
Dept: OBGYN | Facility: CLINIC | Age: 37
End: 2017-08-10
Payer: COMMERCIAL

## 2017-08-10 VITALS — BODY MASS INDEX: 20.83 KG/M2 | SYSTOLIC BLOOD PRESSURE: 100 MMHG | DIASTOLIC BLOOD PRESSURE: 60 MMHG | WEIGHT: 139 LBS

## 2017-08-10 DIAGNOSIS — O09.521 HIGH-RISK PREGNANCY, ELDERLY MULTIGRAVIDA IN FIRST TRIMESTER: Primary | ICD-10-CM

## 2017-08-10 DIAGNOSIS — Z3A.14 14 WEEKS GESTATION OF PREGNANCY: ICD-10-CM

## 2017-08-10 DIAGNOSIS — Z36.1 NEED FOR MATERNAL SERUM ALPHA-PROTEIN (MSAFP) SCREENING: ICD-10-CM

## 2017-08-10 PROCEDURE — 82105 ALPHA-FETOPROTEIN SERUM: CPT | Mod: 90 | Performed by: OBSTETRICS & GYNECOLOGY

## 2017-08-10 PROCEDURE — 36415 COLL VENOUS BLD VENIPUNCTURE: CPT | Performed by: OBSTETRICS & GYNECOLOGY

## 2017-08-10 PROCEDURE — 99000 SPECIMEN HANDLING OFFICE-LAB: CPT | Performed by: OBSTETRICS & GYNECOLOGY

## 2017-08-10 PROCEDURE — 99207 ZZC PRENATAL VISIT: CPT | Performed by: OBSTETRICS & GYNECOLOGY

## 2017-08-10 NOTE — MR AVS SNAPSHOT
"              After Visit Summary   8/10/2017    Ashlee Acevedo    MRN: [id number]           Patient Information     Date Of Birth          1980        Visit Information        Provider Department      8/10/2017 8:20 AM Javi Pierson MD [facility]        Today's Diagnoses     High-risk pregnancy, elderly multigravida in first trimester    -  1    Need for maternal serum alpha-protein (MSAFP) screening        14 weeks gestation of pregnancy           Follow-ups after your visit        Who to contact     If you have questions or need follow up information about today's clinic visit or your schedule please contact [facility] directly at [phone].  Normal or non-critical lab and imaging results will be communicated to you by MyChart, letter or phone within 4 business days after the clinic has received the results. If you do not hear from us within 7 days, please contact the clinic through Cat Amaniahart or phone. If you have a critical or abnormal lab result, we will notify you by phone as soon as possible.  Submit refill requests through Freebee or call your pharmacy and they will forward the refill request to us. Please allow 3 business days for your refill to be completed.          Additional Information About Your Visit        MyChart Information     Freebee lets you send messages to your doctor, view your test results, renew your prescriptions, schedule appointments and more. To sign up, go to [email or web address] . Click on \"Log in\" on the left side of the screen, which will take you to the Welcome page. Then click on \"Sign up Now\" on the right side of the page.     You will be asked to enter the access code listed below, as well as some personal information. Please follow the directions to create your username and password.     Your access code is: [email or web address]  Expires: 2017  9:37 AM     Your access code will  in 90 days. If you need help or a new " code, please call your [place] clinic or [phone].        Care EveryWhere ID     This is your Care EveryWhere ID. This could be used by other organizations to access your [place] medical records  [id number]        Your Vitals Were     Last Period BMI (Body Mass Index)                04/23/2017 (Exact Date) 20.83 kg/m2           Blood Pressure from Last 3 Encounters:   08/10/17 100/60   07/14/17 112/60   07/05/17 110/78    Weight from Last 3 Encounters:   08/10/17 139 lb (63 kg)   07/14/17 134 lb 6.4 oz (61 kg)   07/05/17 132 lb (59.9 kg)              We Performed the Following     Alpha fetoprotein maternal screen        Primary Care Provider Office Phone # Fax #    Javi Pierson [phone] [address] [facility]   [place] [address]        Equal Access to Services     [facility] : Hadii aad ku hadasho Soanais, waaxda luqadaha, qaybta kaalmada adeegyada, waxay johnin haymarta uribe . So [facility] [phone].    ATENCIÓN: Si habla español, tiene a abrams disposición servicios gratuitos de asistencia lingüística. Len al [phone].    We comply with applicable federal civil rights laws and [place] laws. We do not discriminate on the basis of race, color, national origin, age, disability sex, sexual orientation or gender identity.            Thank you!     Thank you for choosing [facility] [place]  for your care. Our goal is always to provide you with excellent care. Hearing back from our patients is one way we can continue to improve our services. Please take a few minutes to complete the written survey that you may receive in the mail after your visit with us. Thank you!             Your Updated Medication List - Protect others around you: Learn how to safely use, store and throw away your medicines at www.disposemymeds.org.          This list is accurate as of: 8/10/17  9:24 AM.  Always use your most recent med list.                   Brand Name  Dispense Instructions for use Diagnosis    prenatal multivitamin plus iron 27-0.8 MG Tabs per tablet     100 tablet    Take 1 tablet by mouth daily

## 2017-08-11 ENCOUNTER — NURSE TRIAGE (OUTPATIENT)
Dept: NURSING | Facility: CLINIC | Age: 37
End: 2017-08-11

## 2017-08-11 LAB
# FETUSES US: NORMAL
AFP ADJ MOM AMN: 0.62
AFP SERPL-MCNC: 16 NG/ML
AGE - REPORTED: 36.5
DATING METHOD: NORMAL
DIABETIC AT CONCEPTION: NO
FAMILY MEMBER DISEASES HX: NO
FAMILY MEMBER DISEASES HX: NO
GA METHOD: NORMAL
GA: 14.29 WK
HX OF HEREDITARY DISORDERS: NO
IDDM PATIENT QL: NO
INTEGRATED SCN PATIENT-IMP: NORMAL
LMP START DATE: NORMAL
PREV HX CHROMOSOME ABNORMALITY: NO
SPECIMEN DRAWN SERPL: NORMAL
TWINS: NORMAL

## 2017-08-11 NOTE — TELEPHONE ENCOUNTER
Pt calling requesting results.  Read information to her from the chart.  Notes Recorded by Javi Pierson MD on 8/11/2017 at 1:14 PM  Normal AFP. Low risk for spina bifida    Pt understood and had no questions.  Willa Trammell MA RN, [facility]

## 2017-08-30 ENCOUNTER — TELEPHONE (OUTPATIENT)
Dept: NURSING | Facility: CLINIC | Age: 37
End: 2017-08-30

## 2017-08-30 NOTE — TELEPHONE ENCOUNTER
Pt calling in with complaints of having strong cramping during the night. ( 17W1d) Cramping was located in the front of her abd to the left side. Rated the cramping at a '6' . This am she is noting some back discomfort. She says that she has a hx of back pain prior to pregnancy. Denies any vaginal bleeding dysuria, urgency. Says her po intake for fluids has been good. Will consult Dr Silva since Dr. Pierson is in surgery today.  Dr Sliva not available at this time. I spoke to Dr Hung. Check with the pt  If she could be constipated- other wise encourage her to increase her fluid intake/ and try to take it easy today and rest.  Discussed with pt Dr Hung recommended- She denies being constipated. She will try to take it easy today and will increase her fluids. Encouraged there pt to call back if any concerns or changes in her symptoms.

## 2017-09-15 ENCOUNTER — RADIANT APPOINTMENT (OUTPATIENT)
Dept: ULTRASOUND IMAGING | Facility: CLINIC | Age: 37
End: 2017-09-15
Payer: COMMERCIAL

## 2017-09-15 ENCOUNTER — PRENATAL OFFICE VISIT (OUTPATIENT)
Dept: OBGYN | Facility: CLINIC | Age: 37
End: 2017-09-15
Payer: COMMERCIAL

## 2017-09-15 VITALS — WEIGHT: 148.4 LBS | DIASTOLIC BLOOD PRESSURE: 60 MMHG | BODY MASS INDEX: 22.24 KG/M2 | SYSTOLIC BLOOD PRESSURE: 108 MMHG

## 2017-09-15 DIAGNOSIS — Z36.89 ENCOUNTER FOR FETAL ANATOMIC SURVEY: ICD-10-CM

## 2017-09-15 DIAGNOSIS — O09.522 HIGH-RISK PREGNANCY, ELDERLY MULTIGRAVIDA, SECOND TRIMESTER: Primary | ICD-10-CM

## 2017-09-15 PROCEDURE — 76805 OB US >/= 14 WKS SNGL FETUS: CPT | Performed by: OBSTETRICS & GYNECOLOGY

## 2017-09-15 PROCEDURE — 99207 ZZC PRENATAL VISIT: CPT | Performed by: OBSTETRICS & GYNECOLOGY

## 2017-09-15 NOTE — PROGRESS NOTES
No loss of fluid/vaginal bleeding/regular contractions. + FM  Keeps getting cold sores. Does not want any suppressive meds.  -Has paper work for her maternity leave, will leave for Dr. patel  -Reviewed anatomy Us, wnl.   - Labor precautions. F/U 4wk    Darlyn Ray Masters, DO

## 2017-09-15 NOTE — MR AVS SNAPSHOT
"              After Visit Summary   9/15/2017    Ashlee Acevedo    MRN: [id number]           Patient Information     Date Of Birth          1980        Visit Information        Provider Department      9/15/2017 9:30 AM Darlyn Springer DO [facility]        Today's Diagnoses     High-risk pregnancy, elderly multigravida, second trimester    -  1       Follow-ups after your visit        Follow-up notes from your care team     Return in about 4 weeks (around 10/13/2017).      Your next 10 appointments already scheduled     Oct 13, 2017  8:20 AM CDT   ESTABLISHED PRENATAL with Javi Pierson MD   [facility] [place] ([facility])    [address] [place] [id number] [phone]              Who to contact     If you have questions or need follow up information about today's clinic visit or your schedule please contact [facility] [place] directly at [phone].  Normal or non-critical lab and imaging results will be communicated to you by Pinshapehart, letter or phone within 4 business days after the clinic has received the results. If you do not hear from us within 7 days, please contact the clinic through SiO2 Nanotecht or phone. If you have a critical or abnormal lab result, we will notify you by phone as soon as possible.  Submit refill requests through Mingxieku or call your pharmacy and they will forward the refill request to us. Please allow 3 business days for your refill to be completed.          Additional Information About Your Visit        Pinshapehart Information     Mingxieku lets you send messages to your doctor, view your test results, renew your prescriptions, schedule appointments and more. To sign up, go to [email or web address] . Click on \"Log in\" on the left side of the screen, which will take you to the Welcome page. Then click on \"Sign up Now\" on the right side of the page.     You will be asked to enter " the access code listed below, as well as some personal information. Please follow the directions to create your username and password.     Your access code is: [email or web address]  Expires: 12/15/2017  9:42 PM     Your access code will  in 90 days. If you need help or a new code, please call your [place] clinic or [phone].        Care EveryWhere ID     This is your Care EveryWhere ID. This could be used by other organizations to access your [place] medical records  [id number]        Your Vitals Were     Last Period BMI (Body Mass Index)                2017 (Exact Date) 22.24 kg/m2           Blood Pressure from Last 3 Encounters:   09/15/17 108/60   08/10/17 100/60   17 112/60    Weight from Last 3 Encounters:   09/15/17 148 lb 6.4 oz (67.3 kg)   08/10/17 139 lb (63 kg)   17 134 lb 6.4 oz (61 kg)              Today, you had the following     No orders found for display       Primary Care Provider Office Phone # Fax #    Javi Pierson [phone] [address] [facility] [address]  [facility] [address]        Equal Access to Services     [facility]: Hadii aad ku hadasho Soomaali, waaxda luqadaha, qaybta kaalmada adeegyada, karissa lopezin hayrolann vick uribe . So [facility] [place] [phone].    ATENCIÓN: Si habla español, tiene a abrams disposición servicios gratuitos de asistencia lingüística. Llame al [phone].    We comply with applicable federal civil rights laws and [place] laws. We do not discriminate on the basis of race, color, national origin, age, disability sex, sexual orientation or gender identity.            Thank you!     Thank you for choosing [facility] FOR [facility]  for your care. Our goal is always to provide you with excellent care. Hearing back from our patients is one way we can continue to improve our services. Please take a few minutes to complete the written survey that you may receive in the mail after your visit with us. Thank you!              Your Updated Medication List - Protect others around you: Learn how to safely use, store and throw away your medicines at www.disposemymeds.org.          This list is accurate as of: 9/15/17 11:59 PM.  Always use your most recent med list.                   Brand Name Dispense Instructions for use Diagnosis    prenatal multivitamin plus iron 27-0.8 MG Tabs per tablet     100 tablet    Take 1 tablet by mouth daily

## 2017-10-13 ENCOUNTER — PRENATAL OFFICE VISIT (OUTPATIENT)
Dept: OBGYN | Facility: CLINIC | Age: 37
End: 2017-10-13
Payer: COMMERCIAL

## 2017-10-13 VITALS — DIASTOLIC BLOOD PRESSURE: 62 MMHG | BODY MASS INDEX: 22.96 KG/M2 | WEIGHT: 153.2 LBS | SYSTOLIC BLOOD PRESSURE: 110 MMHG

## 2017-10-13 DIAGNOSIS — O09.522 HIGH-RISK PREGNANCY, ELDERLY MULTIGRAVIDA IN SECOND TRIMESTER: ICD-10-CM

## 2017-10-13 DIAGNOSIS — Z3A.23 23 WEEKS GESTATION OF PREGNANCY: Primary | ICD-10-CM

## 2017-10-13 PROCEDURE — 99207 ZZC PRENATAL VISIT: CPT | Performed by: OBSTETRICS & GYNECOLOGY

## 2017-10-13 NOTE — MR AVS SNAPSHOT
"              After Visit Summary   10/13/2017    Ashlee Acevedo    MRN: [id number]           Patient Information     Date Of Birth          1980        Visit Information        Provider Department      10/13/2017 8:20 AM Javi Pierson MD [facility]        Today's Diagnoses     23 weeks gestation of pregnancy    -  1    High-risk pregnancy, elderly multigravida in second trimester           Follow-ups after your visit        Who to contact     If you have questions or need follow up information about today's clinic visit or your schedule please contact [facility] directly at [phone].  Normal or non-critical lab and imaging results will be communicated to you by MyChart, letter or phone within 4 business days after the clinic has received the results. If you do not hear from us within 7 days, please contact the clinic through Victorhart or phone. If you have a critical or abnormal lab result, we will notify you by phone as soon as possible.  Submit refill requests through Metaboli or call your pharmacy and they will forward the refill request to us. Please allow 3 business days for your refill to be completed.          Additional Information About Your Visit        MyChart Information     Metaboli lets you send messages to your doctor, view your test results, renew your prescriptions, schedule appointments and more. To sign up, go to [email or web address] . Click on \"Log in\" on the left side of the screen, which will take you to the Welcome page. Then click on \"Sign up Now\" on the right side of the page.     You will be asked to enter the access code listed below, as well as some personal information. Please follow the directions to create your username and password.     Your access code is: [email or web address]  Expires: 12/15/2017  9:42 PM     Your access code will  in 90 days. If you need help or a new code, please call your [facility] or [phone].   "      Care EveryWhere ID     This is your Care EveryWhere ID. This could be used by other organizations to access your [place] medical records  [id number]        Your Vitals Were     Last Period BMI (Body Mass Index)                04/23/2017 (Exact Date) 22.96 kg/m2           Blood Pressure from Last 3 Encounters:   10/13/17 110/62   09/15/17 108/60   08/10/17 100/60    Weight from Last 3 Encounters:   10/13/17 153 lb 3.2 oz (69.5 kg)   09/15/17 148 lb 6.4 oz (67.3 kg)   08/10/17 139 lb (63 kg)              Today, you had the following     No orders found for display       Primary Care Provider Office Phone # Fax #    Javi Pierson [phone] [address] [address]        Equal Access to Services     [facility]: Hadii andre landeros hadasho Soanais, waaxda luqadaha, qaybta kaalmada aderisayada, karissa uribe . So [facility] [phone].    ATENCIÓN: Si habla español, tiene a abrams disposición servicios gratuitos de asistencia lingüística. Dadaame al [phone].    We comply with applicable federal civil rights laws and [place] laws. We do not discriminate on the basis of race, color, national origin, age, disability, sex, sexual orientation, or gender identity.            Thank you!     Thank you for choosing [facility] FOR [facility]  for your care. Our goal is always to provide you with excellent care. Hearing back from our patients is one way we can continue to improve our services. Please take a few minutes to complete the written survey that you may receive in the mail after your visit with us. Thank you!             Your Updated Medication List - Protect others around you: Learn how to safely use, store and throw away your medicines at www.disposemymeds.org.          This list is accurate as of: 10/13/17  8:37 AM.  Always use your most recent med list.                   Brand Name Dispense Instructions for use Diagnosis    prenatal  multivitamin plus iron 27-0.8 MG Tabs per tablet     100 tablet    Take 1 tablet by mouth daily

## 2017-10-27 ENCOUNTER — TELEPHONE (OUTPATIENT)
Dept: NURSING | Facility: CLINIC | Age: 37
End: 2017-10-27

## 2017-10-27 NOTE — TELEPHONE ENCOUNTER
Pt calling back to check in as requested.  She is reporting that she continues to have a dull headache. Inner ear is uncomfortable. Feels off balanced. Feels some deep cramping in her belly rates at a 3-4. Denies vaginal bleeding ...has good baby movement/ drinking lots of fluids.  Will review symptoms with Dr Pierson.  Called the pt back- Recommend that she have her ears evaluated. Be sure that she is eating and drinking plenty of fluids. Pt verbalizes understanding

## 2017-10-27 NOTE — TELEPHONE ENCOUNTER
Dizzy when got up this am to tend to her toddler. Walked into the wall. She said the dizziness lasted for about 1 hour. After she got up she had water and laid back down. She is on her way to work and is feeling better but has a dull headache. She does think that she did not drink much water yesterday because she did not get up during the night to urinate. Denies vaginal bleeding or LOF/ cramping.    She will push fluids today and she will call back today after her meeting and check in with us on how she is feeling.

## 2017-11-10 ENCOUNTER — PRENATAL OFFICE VISIT (OUTPATIENT)
Dept: OBGYN | Facility: CLINIC | Age: 37
End: 2017-11-10
Payer: COMMERCIAL

## 2017-11-10 VITALS — BODY MASS INDEX: 23.52 KG/M2 | SYSTOLIC BLOOD PRESSURE: 112 MMHG | DIASTOLIC BLOOD PRESSURE: 60 MMHG | WEIGHT: 157 LBS

## 2017-11-10 DIAGNOSIS — Z3A.27 27 WEEKS GESTATION OF PREGNANCY: ICD-10-CM

## 2017-11-10 DIAGNOSIS — O09.522 HIGH-RISK PREGNANCY, ELDERLY MULTIGRAVIDA IN SECOND TRIMESTER: Primary | ICD-10-CM

## 2017-11-10 DIAGNOSIS — Z23 NEED FOR TDAP VACCINATION: ICD-10-CM

## 2017-11-10 DIAGNOSIS — Z36.9 ENCOUNTER FOR ANTENATAL SCREENING OF MOTHER: ICD-10-CM

## 2017-11-10 LAB
GLUCOSE 1H P 50 G GLC PO SERPL-MCNC: 97 MG/DL (ref 60–129)
HGB BLD-MCNC: 11.2 G/DL (ref 11.7–15.7)

## 2017-11-10 PROCEDURE — 90715 TDAP VACCINE 7 YRS/> IM: CPT

## 2017-11-10 PROCEDURE — 82950 GLUCOSE TEST: CPT | Performed by: OBSTETRICS & GYNECOLOGY

## 2017-11-10 PROCEDURE — 90471 IMMUNIZATION ADMIN: CPT

## 2017-11-10 PROCEDURE — 36415 COLL VENOUS BLD VENIPUNCTURE: CPT | Performed by: OBSTETRICS & GYNECOLOGY

## 2017-11-10 PROCEDURE — 99207 ZZC PRENATAL VISIT: CPT | Performed by: OBSTETRICS & GYNECOLOGY

## 2017-11-10 PROCEDURE — 00000218 ZZHCL STATISTIC OBHBG - HEMOGLOBIN: Performed by: OBSTETRICS & GYNECOLOGY

## 2017-11-10 NOTE — MR AVS SNAPSHOT
"              After Visit Summary   11/10/2017    Ashlee Acevedo    MRN: [id number]           Patient Information     Date Of Birth          1980        Visit Information        Provider Department      11/10/2017 9:50 AM Javi Pierson MD [facility]        Today's Diagnoses     High-risk pregnancy, elderly multigravida in second trimester    -  1    27 weeks gestation of pregnancy           Follow-ups after your visit        Who to contact     If you have questions or need follow up information about today's clinic visit or your schedule please contact [facility] directly at [phone].  Normal or non-critical lab and imaging results will be communicated to you by MyChart, letter or phone within 4 business days after the clinic has received the results. If you do not hear from us within 7 days, please contact the clinic through [employer] Scientifichart or phone. If you have a critical or abnormal lab result, we will notify you by phone as soon as possible.  Submit refill requests through Audacious or call your pharmacy and they will forward the refill request to us. Please allow 3 business days for your refill to be completed.          Additional Information About Your Visit        MyChart Information     Audacious lets you send messages to your doctor, view your test results, renew your prescriptions, schedule appointments and more. To sign up, go to [email or web address] . Click on \"Log in\" on the left side of the screen, which will take you to the Welcome page. Then click on \"Sign up Now\" on the right side of the page.     You will be asked to enter the access code listed below, as well as some personal information. Please follow the directions to create your username and password.     Your access code is: [email or web address]  Expires: 12/15/2017  8:42 PM     Your access code will  in 90 days. If you need help or a new code, please call your [facility] or [phone].   "      Care EveryWhere ID     This is your Care EveryWhere ID. This could be used by other organizations to access your [place] medical records  [id number]        Your Vitals Were     Last Period Breastfeeding? BMI (Body Mass Index)             04/23/2017 (Exact Date) No 23.52 kg/m2          Blood Pressure from Last 3 Encounters:   11/10/17 112/60   10/13/17 110/62   09/15/17 108/60    Weight from Last 3 Encounters:   11/10/17 157 lb (71.2 kg)   10/13/17 153 lb 3.2 oz (69.5 kg)   09/15/17 148 lb 6.4 oz (67.3 kg)              Today, you had the following     No orders found for display       Primary Care Provider Office Phone # Fax #    Javi Pierson [phone] [address] [facility] [address]  [facility] [address]        Equal Access to Services     [facility]: Hadii aad ku hadasho Soomaali, waaxda luqadaha, qaybta kaalmada adeegyada, karissa ramires haymarta uribe . So [facility] [phone].    ATENCIÓN: Si habla español, tiene a abrams disposición servicios gratuitos de asistencia lingüística. Llame al [phone].    We comply with applicable federal civil rights laws and [place] laws. We do not discriminate on the basis of race, color, national origin, age, disability, sex, sexual orientation, or gender identity.            Thank you!     Thank you for choosing [facility]  for your care. Our goal is always to provide you with excellent care. Hearing back from our patients is one way we can continue to improve our services. Please take a few minutes to complete the written survey that you may receive in the mail after your visit with us. Thank you!             Your Updated Medication List - Protect others around you: Learn how to safely use, store and throw away your medicines at www.disposemymeds.org.          This list is accurate as of: 11/10/17 10:00 AM.  Always use your most recent med list.                   Brand Name Dispense Instructions for use Diagnosis     prenatal multivitamin plus iron 27-0.8 MG Tabs per tablet     100 tablet    Take 1 tablet by mouth daily

## 2017-12-08 ENCOUNTER — PRENATAL OFFICE VISIT (OUTPATIENT)
Dept: OBGYN | Facility: CLINIC | Age: 37
End: 2017-12-08
Payer: COMMERCIAL

## 2017-12-08 VITALS — WEIGHT: 162 LBS | BODY MASS INDEX: 24.27 KG/M2 | DIASTOLIC BLOOD PRESSURE: 62 MMHG | SYSTOLIC BLOOD PRESSURE: 122 MMHG

## 2017-12-08 DIAGNOSIS — O09.523 HIGH-RISK PREGNANCY, ELDERLY MULTIGRAVIDA IN THIRD TRIMESTER: ICD-10-CM

## 2017-12-08 DIAGNOSIS — Z3A.31 31 WEEKS GESTATION OF PREGNANCY: Primary | ICD-10-CM

## 2017-12-08 PROCEDURE — 99207 ZZC PRENATAL VISIT: CPT | Performed by: OBSTETRICS & GYNECOLOGY

## 2017-12-08 NOTE — MR AVS SNAPSHOT
"              After Visit Summary   2017    Ashlee Acevedo    MRN: [id number]           Patient Information     Date Of Birth          1980        Visit Information        Provider Department      2017 11:50 AM Javi Pierson MD [facility]        Today's Diagnoses     31 weeks gestation of pregnancy    -  1    High-risk pregnancy, elderly multigravida in third trimester           Follow-ups after your visit        Who to contact     If you have questions or need follow up information about today's clinic visit or your schedule please contact [facility] directly at [phone].  Normal or non-critical lab and imaging results will be communicated to you by MyChart, letter or phone within 4 business days after the clinic has received the results. If you do not hear from us within 7 days, please contact the clinic through CNZZhart or phone. If you have a critical or abnormal lab result, we will notify you by phone as soon as possible.  Submit refill requests through Shuttlerock or call your pharmacy and they will forward the refill request to us. Please allow 3 business days for your refill to be completed.          Additional Information About Your Visit        MyChart Information     Shuttlerock lets you send messages to your doctor, view your test results, renew your prescriptions, schedule appointments and more. To sign up, go to [email or web address] . Click on \"Log in\" on the left side of the screen, which will take you to the Welcome page. Then click on \"Sign up Now\" on the right side of the page.     You will be asked to enter the access code listed below, as well as some personal information. Please follow the directions to create your username and password.     Your access code is: [email or web address]  Expires: 12/15/2017  8:42 PM     Your access code will  in 90 days. If you need help or a new code, please call your [facility] or [phone].      "   Care EveryWhere ID     This is your Care EveryWhere ID. This could be used by other organizations to access your [place] medical records  [id number]        Your Vitals Were     Last Period BMI (Body Mass Index)                04/23/2017 (Exact Date) 24.27 kg/m2           Blood Pressure from Last 3 Encounters:   12/08/17 122/62   11/10/17 112/60   10/13/17 110/62    Weight from Last 3 Encounters:   12/08/17 162 lb (73.5 kg)   11/10/17 157 lb (71.2 kg)   10/13/17 153 lb 3.2 oz (69.5 kg)              Today, you had the following     No orders found for display       Primary Care Provider Office Phone # Fax #    Javi Pierson [phone] [address] [address]        Equal Access to Services     [facility] : Hadii andre landeros hadasho Soomaali, waaxda luqadaha, qaybta kaalmada adeegyada, karissa uribe . So [facility] [phone].    ATENCIÓN: Si habla español, tiene a abrams disposición servicios gratuitos de asistencia lingüística. Llame al [phone].    We comply with applicable federal civil rights laws and [place] laws. We do not discriminate on the basis of race, color, national origin, age, disability, sex, sexual orientation, or gender identity.            Thank you!     Thank you for choosing [facility] FOR [facility]  for your care. Our goal is always to provide you with excellent care. Hearing back from our patients is one way we can continue to improve our services. Please take a few minutes to complete the written survey that you may receive in the mail after your visit with us. Thank you!             Your Updated Medication List - Protect others around you: Learn how to safely use, store and throw away your medicines at www.disposemymeds.org.          This list is accurate as of: 12/8/17 12:18 PM.  Always use your most recent med list.                   Brand Name Dispense Instructions for use Diagnosis    prenatal multivitamin  plus iron 27-0.8 MG Tabs per tablet     100 tablet    Take 1 tablet by mouth daily

## 2017-12-22 ENCOUNTER — PRENATAL OFFICE VISIT (OUTPATIENT)
Dept: OBGYN | Facility: CLINIC | Age: 37
End: 2017-12-22
Payer: COMMERCIAL

## 2017-12-22 VITALS — DIASTOLIC BLOOD PRESSURE: 60 MMHG | WEIGHT: 167 LBS | SYSTOLIC BLOOD PRESSURE: 110 MMHG | BODY MASS INDEX: 25.02 KG/M2

## 2017-12-22 DIAGNOSIS — Z3A.33 33 WEEKS GESTATION OF PREGNANCY: Primary | ICD-10-CM

## 2017-12-22 DIAGNOSIS — O09.523 HIGH-RISK PREGNANCY, ELDERLY MULTIGRAVIDA IN THIRD TRIMESTER: ICD-10-CM

## 2017-12-22 PROCEDURE — 99207 ZZC PRENATAL VISIT: CPT | Performed by: OBSTETRICS & GYNECOLOGY

## 2017-12-22 NOTE — MR AVS SNAPSHOT
"              After Visit Summary   2017    Ashlee Acevedo    MRN: [id number]           Patient Information     Date Of Birth          1980        Visit Information        Provider Department      2017 7:50 AM Javi Pierson MD [facility]        Today's Diagnoses     33 weeks gestation of pregnancy    -  1    High-risk pregnancy, elderly multigravida in third trimester           Follow-ups after your visit        Who to contact     If you have questions or need follow up information about today's clinic visit or your schedule please contact [facility] directly at [phone].  Normal or non-critical lab and imaging results will be communicated to you by MyChart, letter or phone within 4 business days after the clinic has received the results. If you do not hear from us within 7 days, please contact the clinic through Inventergyhart or phone. If you have a critical or abnormal lab result, we will notify you by phone as soon as possible.  Submit refill requests through LeadPoint or call your pharmacy and they will forward the refill request to us. Please allow 3 business days for your refill to be completed.          Additional Information About Your Visit        MyChart Information     LeadPoint lets you send messages to your doctor, view your test results, renew your prescriptions, schedule appointments and more. To sign up, go to [email or web address] . Click on \"Log in\" on the left side of the screen, which will take you to the Welcome page. Then click on \"Sign up Now\" on the right side of the page.     You will be asked to enter the access code listed below, as well as some personal information. Please follow the directions to create your username and password.     Your access code is: [email or web address]  Expires: 3/22/2018  7:51 AM     Your access code will  in 90 days. If you need help or a new code, please call your [facility] or [phone].      "   Care EveryWhere ID     This is your Care EveryWhere ID. This could be used by other organizations to access your [place] medical records  [id number]        Your Vitals Were     Last Period BMI (Body Mass Index)                04/23/2017 (Exact Date) 25.02 kg/m2           Blood Pressure from Last 3 Encounters:   12/22/17 110/60   12/08/17 122/62   11/10/17 112/60    Weight from Last 3 Encounters:   12/22/17 167 lb (75.8 kg)   12/08/17 162 lb (73.5 kg)   11/10/17 157 lb (71.2 kg)              Today, you had the following     No orders found for display       Primary Care Provider Office Phone # Fax #    Javi Pierson [phone] [address] [address]        Equal Access to Services     EKTA [facility] : Hadii aad ku hadasho Soanais, waaxda luqadaha, qaybta kaalmada aldo, karissa uribe . So [facility] [phone].    ATENCIÓN: Si habla español, tiene a abrams disposición servicios gratuitos de asistencia lingüística. Len al [phone].    We comply with applicable federal civil rights laws and [place] laws. We do not discriminate on the basis of race, color, national origin, age, disability, sex, sexual orientation, or gender identity.            Thank you!     Thank you for choosing [facility]  for your care. Our goal is always to provide you with excellent care. Hearing back from our patients is one way we can continue to improve our services. Please take a few minutes to complete the written survey that you may receive in the mail after your visit with us. Thank you!             Your Updated Medication List - Protect others around you: Learn how to safely use, store and throw away your medicines at www.disposemymeds.org.          This list is accurate as of: 12/22/17  7:51 AM.  Always use your most recent med list.                   Brand Name Dispense Instructions for use Diagnosis    prenatal multivitamin plus  iron 27-0.8 MG Tabs per tablet     100 tablet    Take 1 tablet by mouth daily

## 2018-01-05 ENCOUNTER — PRENATAL OFFICE VISIT (OUTPATIENT)
Dept: OBGYN | Facility: CLINIC | Age: 38
End: 2018-01-05
Payer: COMMERCIAL

## 2018-01-05 VITALS — BODY MASS INDEX: 25.32 KG/M2 | SYSTOLIC BLOOD PRESSURE: 106 MMHG | DIASTOLIC BLOOD PRESSURE: 50 MMHG | WEIGHT: 169 LBS

## 2018-01-05 DIAGNOSIS — Z23 NEED FOR PROPHYLACTIC VACCINATION AND INOCULATION AGAINST INFLUENZA: ICD-10-CM

## 2018-01-05 DIAGNOSIS — Z3A.35 35 WEEKS GESTATION OF PREGNANCY: Primary | ICD-10-CM

## 2018-01-05 DIAGNOSIS — O09.523 HIGH-RISK PREGNANCY, ELDERLY MULTIGRAVIDA IN THIRD TRIMESTER: ICD-10-CM

## 2018-01-05 PROCEDURE — 90471 IMMUNIZATION ADMIN: CPT | Performed by: OBSTETRICS & GYNECOLOGY

## 2018-01-05 PROCEDURE — 90686 IIV4 VACC NO PRSV 0.5 ML IM: CPT | Performed by: OBSTETRICS & GYNECOLOGY

## 2018-01-05 PROCEDURE — 99207 ZZC PRENATAL VISIT: CPT | Performed by: OBSTETRICS & GYNECOLOGY

## 2018-01-05 NOTE — PROGRESS NOTES

## 2018-01-05 NOTE — MR AVS SNAPSHOT
"              After Visit Summary   2018    Ashlee Acevedo    MRN: [id number]           Patient Information     Date Of Birth          1980        Visit Information        Provider Department      2018 2:20 PM Javi Pierson MD [facility] [place]        Today's Diagnoses     35 weeks gestation of pregnancy    -  1    High-risk pregnancy, elderly multigravida in third trimester           Follow-ups after your visit        Who to contact     If you have questions or need follow up information about today's clinic visit or your schedule please contact [facility] [place] directly at [phone].  Normal or non-critical lab and imaging results will be communicated to you by MyChart, letter or phone within 4 business days after the clinic has received the results. If you do not hear from us within 7 days, please contact the clinic through [employer] or phone. If you have a critical or abnormal lab result, we will notify you by phone as soon as possible.  Submit refill requests through Gist or call your pharmacy and they will forward the refill request to us. Please allow 3 business days for your refill to be completed.          Additional Information About Your Visit        MyChart Information     Gist lets you send messages to your doctor, view your test results, renew your prescriptions, schedule appointments and more. To sign up, go to [email or web address] . Click on \"Log in\" on the left side of the screen, which will take you to the Welcome page. Then click on \"Sign up Now\" on the right side of the page.     You will be asked to enter the access code listed below, as well as some personal information. Please follow the directions to create your username and password.     Your access code is: [email or web address]  Expires: 3/22/2018  7:51 AM     Your access code will  in 90 days. If you need help or a new code, please call your [facility] or [phone].      "   Care EveryWhere ID     This is your Care EveryWhere ID. This could be used by other organizations to access your [facility] medical records  [id number]        Your Vitals Were     Last Period BMI (Body Mass Index)                04/23/2017 (Exact Date) 25.32 kg/m2           Blood Pressure from Last 3 Encounters:   01/05/18 106/50   12/22/17 110/60   12/08/17 122/62    Weight from Last 3 Encounters:   01/05/18 169 lb (76.7 kg)   12/22/17 167 lb (75.8 kg)   12/08/17 162 lb (73.5 kg)              Today, you had the following     No orders found for display       Primary Care Provider Office Phone # Fax #    Javi Pierson [phone] [address] [address]        Equal Access to Services     JEFF GROVES : Hadii aad ku hadasho Soanais, waaxda luqadaha, qaybta kaalmada aldo, karissa uribe . So [facility] [phone].    ATENCIÓN: Si habla español, tiene a abrams disposición servicios gratuitos de asistencia lingüística. Len al [phone].    We comply with applicable federal civil rights laws and [place] laws. We do not discriminate on the basis of race, color, national origin, age, disability, sex, sexual orientation, or gender identity.            Thank you!     Thank you for choosing [facility] [place]  for your care. Our goal is always to provide you with excellent care. Hearing back from our patients is one way we can continue to improve our services. Please take a few minutes to complete the written survey that you may receive in the mail after your visit with us. Thank you!             Your Updated Medication List - Protect others around you: Learn how to safely use, store and throw away your medicines at www.disposemymeds.org.          This list is accurate as of: 1/5/18  2:45 PM.  Always use your most recent med list.                   Brand Name Dispense Instructions for use Diagnosis    prenatal multivitamin plus iron  27-0.8 MG Tabs per tablet     100 tablet    Take 1 tablet by mouth daily

## 2018-01-12 ENCOUNTER — PRENATAL OFFICE VISIT (OUTPATIENT)
Dept: OBGYN | Facility: CLINIC | Age: 38
End: 2018-01-12
Payer: COMMERCIAL

## 2018-01-12 VITALS — DIASTOLIC BLOOD PRESSURE: 60 MMHG | BODY MASS INDEX: 25.47 KG/M2 | WEIGHT: 170 LBS | SYSTOLIC BLOOD PRESSURE: 112 MMHG

## 2018-01-12 DIAGNOSIS — Z36.85 ANTENATAL SCREENING FOR STREPTOCOCCUS B: Primary | ICD-10-CM

## 2018-01-12 DIAGNOSIS — O09.523 HIGH-RISK PREGNANCY, ELDERLY MULTIGRAVIDA IN THIRD TRIMESTER: ICD-10-CM

## 2018-01-12 DIAGNOSIS — Z3A.36 36 WEEKS GESTATION OF PREGNANCY: ICD-10-CM

## 2018-01-12 PROCEDURE — 99207 ZZC PRENATAL VISIT: CPT | Performed by: OBSTETRICS & GYNECOLOGY

## 2018-01-12 PROCEDURE — 87653 STREP B DNA AMP PROBE: CPT | Performed by: OBSTETRICS & GYNECOLOGY

## 2018-01-12 NOTE — MR AVS SNAPSHOT
"              After Visit Summary   2018    Ashlee Acevedo    MRN: [id number]           Patient Information     Date Of Birth          1980        Visit Information        Provider Department      2018 11:50 AM Javi Pierson MD [facility]        Today's Diagnoses      screening for streptococcus B    -  1    High-risk pregnancy, elderly multigravida in third trimester        36 weeks gestation of pregnancy           Follow-ups after your visit        Who to contact     If you have questions or need follow up information about today's clinic visit or your schedule please contact [facility] directly at [phone].  Normal or non-critical lab and imaging results will be communicated to you by [employer], letter or phone within 4 business days after the clinic has received the results. If you do not hear from us within 7 days, please contact the clinic through [employer] or phone. If you have a critical or abnormal lab result, we will notify you by phone as soon as possible.  Submit refill requests through [employer] or call your pharmacy and they will forward the refill request to us. Please allow 3 business days for your refill to be completed.          Additional Information About Your Visit        MyChart Information     [employer] lets you send messages to your doctor, view your test results, renew your prescriptions, schedule appointments and more. To sign up, go to [email or web address] . Click on \"Log in\" on the left side of the screen, which will take you to the Welcome page. Then click on \"Sign up Now\" on the right side of the page.     You will be asked to enter the access code listed below, as well as some personal information. Please follow the directions to create your username and password.     Your access code is: [email or web address]  Expires: 3/22/2018  7:51 AM     Your access code will  in 90 days. If you need help or a new code, please " call your [place] clinic or [phone].        Care EveryWhere ID     This is your Care EveryWhere ID. This could be used by other organizations to access your [place] medical records  [id number]        Your Vitals Were     Last Period BMI (Body Mass Index)                04/23/2017 (Exact Date) 25.47 kg/m2           Blood Pressure from Last 3 Encounters:   01/12/18 112/60   01/05/18 106/50   12/22/17 110/60    Weight from Last 3 Encounters:   01/12/18 170 lb (77.1 kg)   01/05/18 169 lb (76.7 kg)   12/22/17 167 lb (75.8 kg)              We Performed the Following     Strep, Group B by [facility]        Primary Care Provider Office Phone # Fax #    Javi Pierson [phone] [address] [facility] [address]  [facility] [address]        Equal Access to Services     [facility]: Hadii aad ku hadasho Soanais, waaxda luqadaha, qaybta kaalmada adeegyada, karissa uribe . So [facility] [phone].    ATENCIÓN: Si habla español, tiene a abrams disposición servicios gratuitos de asistencia lingüística. Len al [phone].    We comply with applicable federal civil rights laws and [place] laws. We do not discriminate on the basis of race, color, national origin, age, disability, sex, sexual orientation, or gender identity.            Thank you!     Thank you for choosing [facility] [place]  for your care. Our goal is always to provide you with excellent care. Hearing back from our patients is one way we can continue to improve our services. Please take a few minutes to complete the written survey that you may receive in the mail after your visit with us. Thank you!             Your Updated Medication List - Protect others around you: Learn how to safely use, store and throw away your medicines at www.disposemymeds.org.          This list is accurate as of: 1/12/18 12:16 PM.  Always use your most recent med list.                   Brand Name Dispense Instructions for use  Diagnosis    prenatal multivitamin plus iron 27-0.8 MG Tabs per tablet     100 tablet    Take 1 tablet by mouth daily

## 2018-01-13 LAB
GP B STREP DNA SPEC QL NAA+PROBE: NEGATIVE
SPECIMEN SOURCE: NORMAL

## 2018-01-19 ENCOUNTER — PRENATAL OFFICE VISIT (OUTPATIENT)
Dept: OBGYN | Facility: CLINIC | Age: 38
End: 2018-01-19
Payer: COMMERCIAL

## 2018-01-19 VITALS — DIASTOLIC BLOOD PRESSURE: 68 MMHG | BODY MASS INDEX: 25.47 KG/M2 | SYSTOLIC BLOOD PRESSURE: 112 MMHG | WEIGHT: 170 LBS

## 2018-01-19 DIAGNOSIS — O09.523 HIGH-RISK PREGNANCY, ELDERLY MULTIGRAVIDA IN THIRD TRIMESTER: ICD-10-CM

## 2018-01-19 DIAGNOSIS — Z3A.37 37 WEEKS GESTATION OF PREGNANCY: Primary | ICD-10-CM

## 2018-01-19 PROCEDURE — 99207 ZZC PRENATAL VISIT: CPT | Performed by: OBSTETRICS & GYNECOLOGY

## 2018-01-19 NOTE — MR AVS SNAPSHOT
"              After Visit Summary   2018    Ashlee Acevedo    MRN: [id number]           Patient Information     Date Of Birth          1980        Visit Information        Provider Department      2018 8:20 AM Javi Pierson MD [facility]        Today's Diagnoses     37 weeks gestation of pregnancy    -  1    High-risk pregnancy, elderly multigravida in third trimester           Follow-ups after your visit        Who to contact     If you have questions or need follow up information about today's clinic visit or your schedule please contact [facility] directly at [phone].  Normal or non-critical lab and imaging results will be communicated to you by MyChart, letter or phone within 4 business days after the clinic has received the results. If you do not hear from us within 7 days, please contact the clinic through [email or web address] or phone. If you have a critical or abnormal lab result, we will notify you by phone as soon as possible.  Submit refill requests through [employer] or call your pharmacy and they will forward the refill request to us. Please allow 3 business days for your refill to be completed.          Additional Information About Your Visit        MyChart Information     [employer] lets you send messages to your doctor, view your test results, renew your prescriptions, schedule appointments and more. To sign up, go to [email or web address] . Click on \"Log in\" on the left side of the screen, which will take you to the Welcome page. Then click on \"Sign up Now\" on the right side of the page.     You will be asked to enter the access code listed below, as well as some personal information. Please follow the directions to create your username and password.     Your access code is: [email or web address]  Expires: 3/22/2018  7:51 AM     Your access code will  in 90 days. If you need help or a new code, please call your [facility] or [phone].      "   Care EveryWhere ID     This is your Care EveryWhere ID. This could be used by other organizations to access your [place] medical records  [id number]        Your Vitals Were     Last Period BMI (Body Mass Index)                04/23/2017 (Exact Date) 25.47 kg/m2           Blood Pressure from Last 3 Encounters:   01/19/18 112/68   01/12/18 112/60   01/05/18 106/50    Weight from Last 3 Encounters:   01/19/18 170 lb (77.1 kg)   01/12/18 170 lb (77.1 kg)   01/05/18 169 lb (76.7 kg)              Today, you had the following     No orders found for display       Primary Care Provider Office Phone # Fax #    Javi Pierson [phone] [address] [address]        Equal Access to Services     EKTA GROVES : Hadii andre burgesso Soanais, waaxda luqadaha, qaybta kaalmada aldo, karissa uribe . So [facility] [phone].    ATENCIÓN: Si habla español, tiene a abrams disposición servicios gratuitos de asistencia lingüística. Len al [phone].    We comply with applicable federal civil rights laws and [place] laws. We do not discriminate on the basis of race, color, national origin, age, disability, sex, sexual orientation, or gender identity.            Thank you!     Thank you for choosing [facility]  for your care. Our goal is always to provide you with excellent care. Hearing back from our patients is one way we can continue to improve our services. Please take a few minutes to complete the written survey that you may receive in the mail after your visit with us. Thank you!             Your Updated Medication List - Protect others around you: Learn how to safely use, store and throw away your medicines at www.disposemymeds.org.          This list is accurate as of: 1/19/18  9:17 AM.  Always use your most recent med list.                   Brand Name Dispense Instructions for use Diagnosis    prenatal multivitamin plus  iron 27-0.8 MG Tabs per tablet     100 tablet    Take 1 tablet by mouth daily

## 2018-01-26 ENCOUNTER — PRENATAL OFFICE VISIT (OUTPATIENT)
Dept: OBGYN | Facility: CLINIC | Age: 38
End: 2018-01-26
Payer: COMMERCIAL

## 2018-01-26 VITALS — DIASTOLIC BLOOD PRESSURE: 66 MMHG | SYSTOLIC BLOOD PRESSURE: 110 MMHG | WEIGHT: 172 LBS | BODY MASS INDEX: 25.77 KG/M2

## 2018-01-26 DIAGNOSIS — O09.523 HIGH-RISK PREGNANCY, ELDERLY MULTIGRAVIDA IN THIRD TRIMESTER: ICD-10-CM

## 2018-01-26 DIAGNOSIS — Z3A.38 38 WEEKS GESTATION OF PREGNANCY: Primary | ICD-10-CM

## 2018-01-26 PROCEDURE — 99207 ZZC PRENATAL VISIT: CPT | Performed by: OBSTETRICS & GYNECOLOGY

## 2018-01-26 NOTE — MR AVS SNAPSHOT
"              After Visit Summary   2018    Ashlee Acevedo    MRN: [id number]           Patient Information     Date Of Birth          1980        Visit Information        Provider Department      2018 7:40 AM Javi Pierson MD [facility]        Today's Diagnoses     38 weeks gestation of pregnancy    -  1    High-risk pregnancy, elderly multigravida in third trimester           Follow-ups after your visit        Who to contact     If you have questions or need follow up information about today's clinic visit or your schedule please contact [facility] directly at [phone].  Normal or non-critical lab and imaging results will be communicated to you by MyChart, letter or phone within 4 business days after the clinic has received the results. If you do not hear from us within 7 days, please contact the clinic through [employer] or phone. If you have a critical or abnormal lab result, we will notify you by phone as soon as possible.  Submit refill requests through [employer] or call your pharmacy and they will forward the refill request to us. Please allow 3 business days for your refill to be completed.          Additional Information About Your Visit        MyChart Information     [employer] lets you send messages to your doctor, view your test results, renew your prescriptions, schedule appointments and more. To sign up, go to [email or web address] . Click on \"Log in\" on the left side of the screen, which will take you to the Welcome page. Then click on \"Sign up Now\" on the right side of the page.     You will be asked to enter the access code listed below, as well as some personal information. Please follow the directions to create your username and password.     Your access code is: [email or web address]  Expires: 3/22/2018  7:51 AM     Your access code will  in 90 days. If you need help or a new code, please call your [facility] or [phone].      "   Care EveryWhere ID     This is your Care EveryWhere ID. This could be used by other organizations to access your [place] medical records  [id number]        Your Vitals Were     Last Period BMI (Body Mass Index)                04/23/2017 (Exact Date) 25.77 kg/m2           Blood Pressure from Last 3 Encounters:   01/26/18 110/66   01/19/18 112/68   01/12/18 112/60    Weight from Last 3 Encounters:   01/26/18 172 lb (78 kg)   01/19/18 170 lb (77.1 kg)   01/12/18 170 lb (77.1 kg)              Today, you had the following     No orders found for display       Primary Care Provider Office Phone # Fax #    Javi Pierson [phone] [address] [address]        Equal Access to Services     EKTA GROVES : Hadii aad ku hadasho Soanais, waaxda luqadaha, qaybta kaalmada aldo, karissa uribe . So [facility] [phone].    ATENCIÓN: Si habla español, tiene a abrams disposición servicios gratuitos de asistencia lingüística. Len al [phone].    We comply with applicable federal civil rights laws and [place] laws. We do not discriminate on the basis of race, color, national origin, age, disability, sex, sexual orientation, or gender identity.            Thank you!     Thank you for choosing [facility]  for your care. Our goal is always to provide you with excellent care. Hearing back from our patients is one way we can continue to improve our services. Please take a few minutes to complete the written survey that you may receive in the mail after your visit with us. Thank you!             Your Updated Medication List - Protect others around you: Learn how to safely use, store and throw away your medicines at www.disposemymeds.org.          This list is accurate as of 1/26/18  7:50 AM.  Always use your most recent med list.                   Brand Name Dispense Instructions for use Diagnosis    prenatal multivitamin plus iron  27-0.8 MG Tabs per tablet     100 tablet    Take 1 tablet by mouth daily

## 2018-01-31 ENCOUNTER — TELEPHONE (OUTPATIENT)
Dept: NURSING | Facility: CLINIC | Age: 38
End: 2018-01-31

## 2018-01-31 ENCOUNTER — HOSPITAL ENCOUNTER (INPATIENT)
Facility: CLINIC | Age: 38
LOS: 2 days | Discharge: HOME | End: 2018-02-02
Attending: OBSTETRICS & GYNECOLOGY | Admitting: OBSTETRICS & GYNECOLOGY
Payer: COMMERCIAL

## 2018-01-31 PROCEDURE — G0463 HOSPITAL OUTPT CLINIC VISIT: HCPCS

## 2018-01-31 PROCEDURE — 25000132 ZZH RX MED GY IP 250 OP 250 PS 637: Performed by: OBSTETRICS & GYNECOLOGY

## 2018-01-31 PROCEDURE — 12000037 ZZH R&B POSTPARTUM INTERMEDIATE

## 2018-01-31 PROCEDURE — 10907ZC DRAINAGE OF AMNIOTIC FLUID, THERAPEUTIC FROM PRODUCTS OF CONCEPTION, VIA NATURAL OR ARTIFICIAL OPENING: ICD-10-PCS | Performed by: OBSTETRICS & GYNECOLOGY

## 2018-01-31 PROCEDURE — 25000128 H RX IP 250 OP 636: Performed by: OBSTETRICS & GYNECOLOGY

## 2018-01-31 PROCEDURE — 72200001 ZZH LABOR CARE VAGINAL DELIVERY SINGLE

## 2018-01-31 PROCEDURE — 59400 OBSTETRICAL CARE: CPT | Performed by: OBSTETRICS & GYNECOLOGY

## 2018-01-31 PROCEDURE — 0KQM0ZZ REPAIR PERINEUM MUSCLE, OPEN APPROACH: ICD-10-PCS | Performed by: OBSTETRICS & GYNECOLOGY

## 2018-01-31 RX ORDER — OXYTOCIN/0.9 % SODIUM CHLORIDE 30/500 ML
340 PLASTIC BAG, INJECTION (ML) INTRAVENOUS CONTINUOUS PRN
Status: DISCONTINUED | OUTPATIENT
Start: 2018-01-31 | End: 2018-02-02 | Stop reason: HOSPADM

## 2018-01-31 RX ORDER — METHYLERGONOVINE MALEATE 0.2 MG/ML
200 INJECTION INTRAVENOUS
Status: DISCONTINUED | OUTPATIENT
Start: 2018-01-31 | End: 2018-01-31

## 2018-01-31 RX ORDER — IBUPROFEN 400 MG/1
800 TABLET, FILM COATED ORAL
Status: DISCONTINUED | OUTPATIENT
Start: 2018-01-31 | End: 2018-01-31

## 2018-01-31 RX ORDER — LANOLIN 100 %
OINTMENT (GRAM) TOPICAL
Status: DISCONTINUED | OUTPATIENT
Start: 2018-01-31 | End: 2018-02-02 | Stop reason: HOSPADM

## 2018-01-31 RX ORDER — ACETAMINOPHEN 325 MG/1
650 TABLET ORAL EVERY 4 HOURS PRN
Status: DISCONTINUED | OUTPATIENT
Start: 2018-01-31 | End: 2018-01-31

## 2018-01-31 RX ORDER — NALOXONE HYDROCHLORIDE 0.4 MG/ML
.1-.4 INJECTION, SOLUTION INTRAMUSCULAR; INTRAVENOUS; SUBCUTANEOUS
Status: DISCONTINUED | OUTPATIENT
Start: 2018-01-31 | End: 2018-01-31

## 2018-01-31 RX ORDER — BISACODYL 10 MG
10 SUPPOSITORY, RECTAL RECTAL DAILY PRN
Status: DISCONTINUED | OUTPATIENT
Start: 2018-02-02 | End: 2018-02-02 | Stop reason: HOSPADM

## 2018-01-31 RX ORDER — AMOXICILLIN 250 MG
2 CAPSULE ORAL 2 TIMES DAILY PRN
Status: DISCONTINUED | OUTPATIENT
Start: 2018-01-31 | End: 2018-02-02 | Stop reason: HOSPADM

## 2018-01-31 RX ORDER — NALOXONE HYDROCHLORIDE 0.4 MG/ML
.1-.4 INJECTION, SOLUTION INTRAMUSCULAR; INTRAVENOUS; SUBCUTANEOUS
Status: DISCONTINUED | OUTPATIENT
Start: 2018-01-31 | End: 2018-02-02 | Stop reason: HOSPADM

## 2018-01-31 RX ORDER — OXYCODONE AND ACETAMINOPHEN 5; 325 MG/1; MG/1
1 TABLET ORAL
Status: DISCONTINUED | OUTPATIENT
Start: 2018-01-31 | End: 2018-01-31

## 2018-01-31 RX ORDER — ONDANSETRON 2 MG/ML
4 INJECTION INTRAMUSCULAR; INTRAVENOUS EVERY 6 HOURS PRN
Status: DISCONTINUED | OUTPATIENT
Start: 2018-01-31 | End: 2018-01-31

## 2018-01-31 RX ORDER — OXYTOCIN/0.9 % SODIUM CHLORIDE 30/500 ML
100 PLASTIC BAG, INJECTION (ML) INTRAVENOUS CONTINUOUS
Status: DISCONTINUED | OUTPATIENT
Start: 2018-01-31 | End: 2018-02-02 | Stop reason: HOSPADM

## 2018-01-31 RX ORDER — HYDROCORTISONE 2.5 %
CREAM (GRAM) TOPICAL 3 TIMES DAILY PRN
Status: DISCONTINUED | OUTPATIENT
Start: 2018-01-31 | End: 2018-02-02 | Stop reason: HOSPADM

## 2018-01-31 RX ORDER — AMOXICILLIN 250 MG
1 CAPSULE ORAL 2 TIMES DAILY PRN
Status: DISCONTINUED | OUTPATIENT
Start: 2018-01-31 | End: 2018-02-02 | Stop reason: HOSPADM

## 2018-01-31 RX ORDER — OXYTOCIN 10 [USP'U]/ML
10 INJECTION, SOLUTION INTRAMUSCULAR; INTRAVENOUS
Status: DISCONTINUED | OUTPATIENT
Start: 2018-01-31 | End: 2018-02-02 | Stop reason: HOSPADM

## 2018-01-31 RX ORDER — MISOPROSTOL 200 UG/1
400 TABLET ORAL
Status: DISCONTINUED | OUTPATIENT
Start: 2018-01-31 | End: 2018-02-02 | Stop reason: HOSPADM

## 2018-01-31 RX ORDER — CARBOPROST TROMETHAMINE 250 UG/ML
250 INJECTION, SOLUTION INTRAMUSCULAR
Status: DISCONTINUED | OUTPATIENT
Start: 2018-01-31 | End: 2018-01-31

## 2018-01-31 RX ORDER — SODIUM CHLORIDE, SODIUM LACTATE, POTASSIUM CHLORIDE, CALCIUM CHLORIDE 600; 310; 30; 20 MG/100ML; MG/100ML; MG/100ML; MG/100ML
INJECTION, SOLUTION INTRAVENOUS CONTINUOUS
Status: DISCONTINUED | OUTPATIENT
Start: 2018-01-31 | End: 2018-01-31

## 2018-01-31 RX ORDER — ACETAMINOPHEN 325 MG/1
650 TABLET ORAL EVERY 4 HOURS PRN
Status: DISCONTINUED | OUTPATIENT
Start: 2018-01-31 | End: 2018-02-02 | Stop reason: HOSPADM

## 2018-01-31 RX ORDER — IBUPROFEN 400 MG/1
800 TABLET, FILM COATED ORAL EVERY 6 HOURS PRN
Status: DISCONTINUED | OUTPATIENT
Start: 2018-01-31 | End: 2018-02-02 | Stop reason: HOSPADM

## 2018-01-31 RX ORDER — OXYTOCIN 10 [USP'U]/ML
10 INJECTION, SOLUTION INTRAMUSCULAR; INTRAVENOUS
Status: COMPLETED | OUTPATIENT
Start: 2018-01-31 | End: 2018-01-31

## 2018-01-31 RX ORDER — OXYTOCIN/0.9 % SODIUM CHLORIDE 30/500 ML
100-340 PLASTIC BAG, INJECTION (ML) INTRAVENOUS CONTINUOUS PRN
Status: DISCONTINUED | OUTPATIENT
Start: 2018-01-31 | End: 2018-01-31

## 2018-01-31 RX ADMIN — IBUPROFEN 800 MG: 400 TABLET ORAL at 15:06

## 2018-01-31 RX ADMIN — IBUPROFEN 800 MG: 400 TABLET ORAL at 21:42

## 2018-01-31 RX ADMIN — OXYTOCIN 10 UNITS: 10 INJECTION INTRAVENOUS at 14:10

## 2018-01-31 NOTE — TELEPHONE ENCOUNTER
Pt reports that she was woken up about 3am with contractions- Went to work.  Calling in that contractions are more intense- 5 to 6 minutes apart.         If greater than 35 weeks contractions every 4-5 minutes lasting for one minute, for more than one hour: yes   Send to L&D, call and let charge RN know pt is coming.    If less than 35 weeks and contractions more often than 6/hour: no   Determine from pt's chart if the pt has a history of  delivery prior to 36 weeks: no    If yes, notify MD    If contractions for less than one hour, mild, no bleeding: no   Oral hydration, empty bladder, lie down for one hour. If contractions continue more than 6/hr, call back and refer to L&D. Notify L&D.    If patient has had contractions more than 6/hr despite the above, or if contractions are strong/getting stronger, associated with other symptoms such as bleeding, etc: no   Notify on call MD immediatly    Pt sent to MAC Unit/ Mac Unit notified and Dr Hung

## 2018-01-31 NOTE — IP AVS SNAPSHOT
[address]    [place] [address]    Phone:  [phone]                                       After Visit Summary   1/31/2018    Ashlee Acevedo    MRN: [id number]           After Visit Summary Signature Page     I have received my discharge instructions, and my questions have been answered. I have discussed any challenges I see with this plan with the nurse or doctor.    ..........................................................................................................................................  Patient/Patient Representative Signature      ..........................................................................................................................................  Patient Representative Print Name and Relationship to Patient    ..................................................               ................................................  Date                                            Time    ..........................................................................................................................................  Reviewed by Signature/Title    ...................................................              ..............................................  Date                                                            Time

## 2018-01-31 NOTE — PROGRESS NOTES
of viable female at 1400   Baby's weight lbs. oz.     Apgars 8, 9     Small second degree tear.   Nuchal loop X 1    Primary OB: [facility]

## 2018-01-31 NOTE — IP AVS SNAPSHOT
MRN:[id number]                      After Visit Summary   1/31/2018    Ashlee Acevedo    MRN: [id number]           Thank you!     Thank you for choosing [facility] for your care. Our goal is always to provide you with excellent care. Hearing back from our patients is one way we can continue to improve our services. Please take a few minutes to complete the written survey that you may receive in the mail after you visit with us. Thank you!        Patient Information     Date Of Birth          1980        About your hospital stay     You were admitted on:  January 31, 2018 You last received care in the:  [address]    You were discharged on:  February 2, 2018       Who to Call     For medical emergencies, please call 911.  For non-urgent questions about your medical care, please call your primary care provider or clinic, [phone]          Attending Provider     Provider Specialty    Litzy Hung MD OB/Gyn    Kenna, Javi Brooks MD OB/Gyn       Primary Care Provider Office Phone # Fax #    Javi Pierson [phone]      After Care Instructions     Activity       Review discharge instructions            Diet       Resume previous diet            Discharge Instructions - Hypertensive disorders patients       High Blood pressure patients to follow up in clinic or via home care within one week for a blood pressure check            Discharge Instructions - Postpartum visit       Schedule postpartum visit with your provider and return to clinic in 6 weeks.                  Further instructions from your care team       Postpartum Vaginal Delivery Instructions    Activity       Ask family and friends for help when you need it.    Do not place anything in your vagina for 6 weeks.    You are not restricted on other activities, but take it easy for a few weeks to allow your body to recover from delivery.  You are able to do any activities you  feel up to that point.    No driving until you have stopped taking your pain medications (usually two weeks after delivery).     Call your health care provider if you have any of these symptoms:       Increased pain, swelling, redness, or fluid around your stiches from an episiotomy or perineal tear.    A fever above 100.4 F (38 C) with or without chills when placing a thermometer under your tongue.    You soak a sanitary pad with blood within 1 hour, or you see blood clots larger than a golf ball.    Bleeding that lasts more than 6 weeks.    Vaginal discharge that smells bad.    Severe pain, cramping or tenderness in your lower belly area.    A need to urinate more frequently (use the toilet more often), more urgently (use the toilet very quickly), or it burns when you urinate.    Nausea and vomiting.    Redness, swelling or pain around a vein in your leg.    Problems breastfeeding or a red or painful area on your breast.    Chest pain and cough or are gasping for air.    Problems coping with sadness, anxiety, or depression.  If you have any concerns about hurting yourself or the baby, call your provider immediately.     You have questions or concerns after you return home.     Keep your hands clean:  Always wash your hands before touching your perineal area and stitches.  This helps reduce your risk of infection.  If your hands aren't dirty, you may use an alcohol hand-rub to clean your hands. Keep your nails clean and short.        Pending Results     No orders found from 1/29/2018 to 2/1/2018.            Statement of Approval     Ordered          02/02/18 0719  I have reviewed and agree with all the recommendations and orders detailed in this document.  EFFECTIVE NOW     Approved and electronically signed by:  Javi Pierson MD             Admission Information     Date & Time Provider Department Dept. Phone    1/31/2018 Javi Pierson MD [address] [phone]     "  Your Vitals Were     Blood Pressure Pulse Temperature Respirations Height Weight    110/73 93 97.7  F (36.5  C) 16 1.727 m (5' 8\") 77.1 kg (170 lb)    Last Period BMI (Body Mass Index)                2017 (Exact Date) 25.85 kg/m2          EDMdesigner Information     [employer] lets you send messages to your doctor, view your test results, renew your prescriptions, schedule appointments and more. To sign up, go to [email or web address] . Click on \"Log in\" on the left side of the screen, which will take you to the Welcome page. Then click on \"Sign up Now\" on the right side of the page.     You will be asked to enter the access code listed below, as well as some personal information. Please follow the directions to create your username and password.     Your access code is: [email or web address]  Expires: 3/22/2018  7:51 AM     Your access code will  in 90 days. If you need help or a new code, please call your [facility] clinic or [phone].        Care EveryWhere ID     This is your Care EveryWhere ID. This could be used by other organizations to access your [facility] medical records  [id number]        Equal Access to Services     [facility]: Hadjulissa burgesso Socarrieali, waaxda luqadaha, qaybta kaalmada adeegyada, karissa butcher. So [facility] [phone].    ATENCIÓN: Si habla español, tiene a abrams disposición servicios gratuitos de asistencia lingüística. Llame al [phone].    We comply with applicable federal civil rights laws and [place] laws. We do not discriminate on the basis of race, color, national origin, age, disability, sex, sexual orientation, or gender identity.               Review of your medicines      CONTINUE these medicines which have NOT CHANGED        Dose / Directions    prenatal multivitamin plus iron 27-0.8 MG Tabs per tablet        Dose:  1 tablet   Take 1 tablet by mouth daily   Quantity:  100 tablet   Refills:  3                Protect others around you: " Learn how to safely use, store and throw away your medicines at www.disposemymeds.org.             Medication List: This is a list of all your medications and when to take them. Check marks below indicate your daily home schedule. Keep this list as a reference.      Medications           Morning Afternoon Evening Bedtime As Needed    prenatal multivitamin plus iron 27-0.8 MG Tabs per tablet   Take 1 tablet by mouth daily

## 2018-01-31 NOTE — OP NOTE
Procedure Date: 2018      The patient is a 37-year-old para 2-0-0-2 at 39 weeks gestation.  Pregnancy has been uncomplicated with normal labs.  The patient arrived on Labor and Delivery with spontaneous labor at 8 cm dilatation.  She was admitted and made rapid progress to complete dilatation.  She then had artificial rupture of membranes with clear fluid.  The vertex was already at a +2 station.  The patient then pushed twice and delivered the fetal vertex in the DENG position over a small second-degree laceration.  The patient had repair of the tear with 3-0 Vicryl in layers in the usual fashion.  Baby had been delivered onto maternal abdomen.  Umbilical cord was doubly clamped and cut after appropriate waiting period 3-vessel cord was noted.      Placenta was then delivered spontaneously and intact.  The uterus contracted after massage and the patient was hemostatic.      FINAL DIAGNOSES:   1.  Intrauterine pregnancy at 39 weeks gestation with delivery of viable female infant over a second-degree laceration with Apgars of 8 and 9.     2.  Precipitous labor and delivery.         JAYME WOODS MD             D: 2018   T: 2018   MT: JOSEPH      Name:     TISHA DONAHUE   MRN:      [id number]        Account:        [id number]   :      1980           Procedure Date: 2018      Document: [id number]

## 2018-01-31 NOTE — PLAN OF CARE
Data: Ashlee Acevedo transferred to 402 via wheelchair at 1645. Baby transferred via parent's arms.  Action: Receiving unit notified of transfer: Yes. Patient and family notified of room change. Report given to Eduarda BARRAZA RN at 1650. Belongings sent to receiving unit. Accompanied by Registered Nurse. Oriented patient to surroundings. Call light within reach. ID bands double-checked with receiving RN.  Response: Patient tolerated transfer and is stable.

## 2018-01-31 NOTE — PLAN OF CARE
1316:   Presents to triage to be evaluated for labor. POC discussed. Monitors applied, assessment obtained. Pt denies leaking amniotic fluid and no vaginal bleeding. SVE 8cm. Paged Dr. Hung- will follow if Dr. Pierson unable. Phone call to Dr. Pierson- will be on his way for delivery. Report given to Mary Jane De Dios. Pt ambulating to room 233.

## 2018-02-01 PROCEDURE — 12000037 ZZH R&B POSTPARTUM INTERMEDIATE

## 2018-02-01 PROCEDURE — 25000132 ZZH RX MED GY IP 250 OP 250 PS 637: Performed by: OBSTETRICS & GYNECOLOGY

## 2018-02-01 RX ADMIN — IBUPROFEN 800 MG: 400 TABLET ORAL at 16:01

## 2018-02-01 RX ADMIN — IBUPROFEN 800 MG: 400 TABLET ORAL at 03:42

## 2018-02-01 RX ADMIN — IBUPROFEN 800 MG: 400 TABLET ORAL at 09:51

## 2018-02-01 RX ADMIN — IBUPROFEN 800 MG: 400 TABLET ORAL at 22:06

## 2018-02-01 RX ADMIN — SENNOSIDES AND DOCUSATE SODIUM 1 TABLET: 8.6; 5 TABLET ORAL at 09:51

## 2018-02-01 RX ADMIN — SENNOSIDES AND DOCUSATE SODIUM 1 TABLET: 8.6; 5 TABLET ORAL at 22:06

## 2018-02-01 NOTE — LACTATION NOTE
Initial Lactation visit.  Recommend unlimited, frequent breast feedings: At least 8 - 12 times every 24 hours. Avoid pacifiers and supplementation with formula unless medically indicated. Explained benefits of holding baby skin on skin to help promote better breastfeeding outcomes. Infant has been feeding well.  Ashlee had just weaned her older child 6 weeks ago.  Discussed normal cluster feeding and signs infant is getting enough.  Infant latched well during visit with audible swallowing.  Will revisit as needed.    Yuridia Juan RN, IBCLC

## 2018-02-01 NOTE — PLAN OF CARE
Problem: Postpartum (Vaginal Delivery) (Adult,Obstetrics,Pediatric)  Goal: Signs and Symptoms of Listed Potential Problems Will be Absent, Minimized or Managed (Postpartum)  Signs and symptoms of listed potential problems will be absent, minimized or managed by discharge/transition of care (reference Postpartum (Vaginal Delivery) (Adult,Obstetrics,Pediatric) CPG).   Outcome: Improving  Patient arrived accompanied by recovery nurse and spouse.  They have [facility] two other [facility] and they are familiar with our unit.  Nurse brought them up to date on new policies such as the Wise Depression Scale and checking the 5 digit code between infant and parents.  Reviewed safety protocols.  Mother is up independently and voiding well.  Pain is controlled with ibuprofen and Tylenol.  She and spouse are working on infant cares.  All questions answered.

## 2018-02-01 NOTE — PLAN OF CARE
Problem: Patient Care Overview  Goal: Plan of Care/Patient Progress Review  Outcome: Improving  Vital signs stable. Patient voiding without difficulty. Able to ambulate in room free of dizziness. Taking ibuprofen for mild pain. Working on breastfeeding infant a minimum of every 2-3 hours. Infant has been cluster feeding throughout day. Encouraged to call with questions/concerns. Will continue to monitor.

## 2018-02-01 NOTE — PLAN OF CARE
Problem: Patient Care Overview  Goal: Plan of Care/Patient Progress Review  Outcome: Improving  VSS, working on breastfeeding infant, pain controlled with Ibuprofen, tucks and icepack, states satisfaction with pain management, up independently with no complaints of dizziness, voiding adequately,  at bedside, interacting and bonding well with infant, encouraged to call with questions or concerns, will continue to monitor.

## 2018-02-02 VITALS
BODY MASS INDEX: 25.76 KG/M2 | HEIGHT: 68 IN | RESPIRATION RATE: 16 BRPM | WEIGHT: 170 LBS | TEMPERATURE: 97.7 F | DIASTOLIC BLOOD PRESSURE: 73 MMHG | HEART RATE: 93 BPM | SYSTOLIC BLOOD PRESSURE: 110 MMHG

## 2018-02-02 PROCEDURE — 25000132 ZZH RX MED GY IP 250 OP 250 PS 637: Performed by: OBSTETRICS & GYNECOLOGY

## 2018-02-02 RX ADMIN — IBUPROFEN 800 MG: 400 TABLET ORAL at 11:40

## 2018-02-02 RX ADMIN — SENNOSIDES AND DOCUSATE SODIUM 1 TABLET: 8.6; 5 TABLET ORAL at 11:41

## 2018-02-02 RX ADMIN — IBUPROFEN 800 MG: 400 TABLET ORAL at 05:29

## 2018-02-02 NOTE — LACTATION NOTE
Follow up visit.  Infant feeding well.  Ashlee reports milk is coming in.  Reviewed signs infant is getting enough.  Outpatient lactation resources reviewed if needed upon discharge.  Ashlee had no concerns.  Nipples are slightly tender but intact.  Has MotherLove cream to use at home.     Yuridia Juan  RN, IBCLC

## 2018-02-02 NOTE — PLAN OF CARE
Problem: Patient Care Overview  Goal: Plan of Care/Patient Progress Review  Outcome: Adequate for Discharge Date Met: 18  VSS.  Pain well controlled, requesting prn pain medications as needed.  Up independently in room.  Working on breastfeeding  and  cares. On pathway. Continue to monitor and notify MD as needed.

## 2018-03-06 ENCOUNTER — TELEPHONE (OUTPATIENT)
Dept: OBGYN | Facility: CLINIC | Age: 38
End: 2018-03-06

## 2018-03-06 NOTE — TELEPHONE ENCOUNTER
Breast pump Rx printed and signed by Dr. Pierson. Pt contacted and she wants Rx sent to [facility] [phone]. Rx sent.

## 2018-03-06 NOTE — TELEPHONE ENCOUNTER
Most likely just a bleed and not a menses. Can be from where the placenta had been adherent. Just observe

## 2018-03-06 NOTE — TELEPHONE ENCOUNTER
18. Pt states she is back to a bright red heavy vaginal flow. Changing pad every 2 hours. No clots. Mild cramping, does not require medication. Pt is active, caring for three children. No change in activity. Pt is breast feeding. Pt states bleeding almost feels like a cycle. With other two children cycle did not start for 13 months after delivery. Pt denies dizziness, feeling lightheaded or SOB. Post partum visit 3/15/18.  Routing to Dr. Pierson. Please advise.

## 2018-03-20 ENCOUNTER — PRENATAL OFFICE VISIT (OUTPATIENT)
Dept: OBGYN | Facility: CLINIC | Age: 38
End: 2018-03-20
Payer: COMMERCIAL

## 2018-03-20 VITALS
HEART RATE: 68 BPM | HEIGHT: 68 IN | DIASTOLIC BLOOD PRESSURE: 76 MMHG | SYSTOLIC BLOOD PRESSURE: 110 MMHG | WEIGHT: 155 LBS | BODY MASS INDEX: 23.49 KG/M2

## 2018-03-20 PROBLEM — O09.523 HIGH-RISK PREGNANCY, ELDERLY MULTIGRAVIDA IN THIRD TRIMESTER: Status: RESOLVED | Noted: 2017-06-16 | Resolved: 2018-03-20

## 2018-03-20 PROCEDURE — 87624 HPV HI-RISK TYP POOLED RSLT: CPT | Performed by: OBSTETRICS & GYNECOLOGY

## 2018-03-20 PROCEDURE — 99207 ZZC POST PARTUM EXAM: CPT | Performed by: OBSTETRICS & GYNECOLOGY

## 2018-03-20 PROCEDURE — G0145 SCR C/V CYTO,THINLAYER,RESCR: HCPCS | Performed by: OBSTETRICS & GYNECOLOGY

## 2018-03-20 ASSESSMENT — ANXIETY QUESTIONNAIRES
5. BEING SO RESTLESS THAT IT IS HARD TO SIT STILL: NOT AT ALL
2. NOT BEING ABLE TO STOP OR CONTROL WORRYING: NOT AT ALL
3. WORRYING TOO MUCH ABOUT DIFFERENT THINGS: NOT AT ALL
6. BECOMING EASILY ANNOYED OR IRRITABLE: NOT AT ALL
7. FEELING AFRAID AS IF SOMETHING AWFUL MIGHT HAPPEN: NOT AT ALL
1. FEELING NERVOUS, ANXIOUS, OR ON EDGE: NOT AT ALL
GAD7 TOTAL SCORE: 0

## 2018-03-20 ASSESSMENT — PATIENT HEALTH QUESTIONNAIRE - PHQ9: 5. POOR APPETITE OR OVEREATING: NOT AT ALL

## 2018-03-20 NOTE — PROGRESS NOTES
"  SUBJECTIVE:  Ashlee Acevedo,  is here for a postpartum visit.  She had a  on 18 delivering a healthy baby girl, named Rosibel weighing 7 lbs 4 oz at term.      HPI:  No problems. Mood good. Baby is good. No bleeding. No pain from tear.   plans VAS    Last PHQ-9 score on record=   PHQ-9 SCORE 3/20/2018   Total Score 0     SHAWNEE-7 SCORE 3/29/2016 2017 3/20/2018   Total Score 0 0 0       Delivery complications:  No  Breast feeding:  Yes,   Bladder problems:  No  Bowel problems/hemorrhoids:  No  Episiotomy/laceration/incision healed? Yes:   Vaginal flow:  None  Ladysmith:  No  Contraception: none  Emotional adjustment:  doing well  Back to work:  Will be going back 2 days/week in Mid may    12 point review of systems negative other than symptoms noted below.    OBJECTIVE:  Vitals: /76  Pulse 68  Ht 5' 8\" (1.727 m)  Wt 155 lb (70.3 kg)  LMP 2017 (Exact Date)  BMI 23.57 kg/m2  BMI= Body mass index is 23.57 kg/(m^2).  General - pleasant female in no acute distress.  Pelvic - EG: normal adult female, BUS: within normal limits, Vagina: well rugated, no discharge, Cervix: no lesions  ASSESSMENT:    ICD-10-CM    1. Routine postpartum follow-up Z39.2 Pap imaged thin layer screen with HPV - recommended age 30 - 65     HPV High Risk Types DNA Cervical       PLAN:  May resume normal activities without restrictions.  Pap smear was done today.    Full counseling was provided, and all questions were answered.   Return to clinic in one year for an annual visit.   Gave card for urology.     Javi Pierson MD      "

## 2018-03-20 NOTE — MR AVS SNAPSHOT
"              After Visit Summary   3/20/2018    Ashlee Acevedo    MRN: [id number]           Patient Information     Date Of Birth          1980        Visit Information        Provider Department      3/20/2018 8:00 AM Javi Pierson MD [facility]        Today's Diagnoses     Routine postpartum follow-up    -  1       Follow-ups after your visit        Who to contact     If you have questions or need follow up information about today's clinic visit or your schedule please contact [facility] directly at [phone].  Normal or non-critical lab and imaging results will be communicated to you by "[employer]hart, letter or phone within 4 business days after the clinic has received the results. If you do not hear from us within 7 days, please contact the clinic through "[employer]hart or phone. If you have a critical or abnormal lab result, we will notify you by phone as soon as possible.  Submit refill requests through [employer] or call your pharmacy and they will forward the refill request to us. Please allow 3 business days for your refill to be completed.          Additional Information About Your Visit        MyChart Information     [employer] lets you send messages to your doctor, view your test results, renew your prescriptions, schedule appointments and more. To sign up, go to [email or web address] . Click on \"Log in\" on the left side of the screen, which will take you to the Welcome page. Then click on \"Sign up Now\" on the right side of the page.     You will be asked to enter the access code listed below, as well as some personal information. Please follow the directions to create your username and password.     Your access code is: [email or web address]  Expires: 3/22/2018  8:51 AM     Your access code will  in 90 days. If you need help or a new code, please call your [facility] clinic or [phone].        Care EveryWhere ID     This is your Care EveryWhere ID. This could be " "used by other organizations to access your [facility] medical records  [id number]        Your Vitals Were     Pulse Height Last Period BMI (Body Mass Index)          68 5' 8\" (1.727 m) 04/23/2017 (Exact Date) 23.57 kg/m2         Blood Pressure from Last 3 Encounters:   03/20/18 110/76   02/02/18 110/73   01/26/18 110/66    Weight from Last 3 Encounters:   03/20/18 155 lb (70.3 kg)   01/31/18 170 lb (77.1 kg)   01/26/18 172 lb (78 kg)              We Performed the Following     HPV High Risk Types DNA Cervical     Pap imaged thin layer screen with HPV - recommended age 30 - 65        Primary Care Provider Office Phone # Fax #    Javi Pierson [phone] [address] [address]        Equal Access to Services     [facility]: Hadii andre landeros hadasho Soanais, waaxda luqadaha, qaybta kaalmada adeegyada, karissa uribe . So [facility] [phone].    ATENCIÓN: Si habla español, tiene a abrams disposición servicios gratuitos de asistencia lingüística. Llame al [phone].    We comply with applicable federal civil rights laws and [place] laws. We do not discriminate on the basis of race, color, national origin, age, disability, sex, sexual orientation, or gender identity.            Thank you!     Thank you for choosing [facility]  for your care. Our goal is always to provide you with excellent care. Hearing back from our patients is one way we can continue to improve our services. Please take a few minutes to complete the written survey that you may receive in the mail after your visit with us. Thank you!             Your Updated Medication List - Protect others around you: Learn how to safely use, store and throw away your medicines at www.disposemymeds.org.          This list is accurate as of 3/20/18 11:24 AM.  Always use your most recent med list.                   Brand Name Dispense Instructions for use Diagnosis    prenatal " multivitamin plus iron 27-0.8 MG Tabs per tablet     100 tablet    Take 1 tablet by mouth daily

## 2018-03-21 ASSESSMENT — PATIENT HEALTH QUESTIONNAIRE - PHQ9: SUM OF ALL RESPONSES TO PHQ QUESTIONS 1-9: 0

## 2018-03-21 ASSESSMENT — ANXIETY QUESTIONNAIRES: GAD7 TOTAL SCORE: 0

## 2018-03-22 LAB
COPATH REPORT: NORMAL
PAP: NORMAL

## 2018-03-23 LAB
FINAL DIAGNOSIS: ABNORMAL
HPV HR 12 DNA CVX QL NAA+PROBE: POSITIVE
HPV16 DNA SPEC QL NAA+PROBE: NEGATIVE
HPV18 DNA SPEC QL NAA+PROBE: NEGATIVE
SPECIMEN DESCRIPTION: ABNORMAL
SPECIMEN SOURCE CVX/VAG CYTO: ABNORMAL

## 2018-06-22 ENCOUNTER — OFFICE VISIT (OUTPATIENT)
Dept: OBGYN | Facility: CLINIC | Age: 38
End: 2018-06-22
Payer: COMMERCIAL

## 2018-06-22 VITALS
HEIGHT: 68 IN | BODY MASS INDEX: 21.82 KG/M2 | DIASTOLIC BLOOD PRESSURE: 60 MMHG | SYSTOLIC BLOOD PRESSURE: 100 MMHG | WEIGHT: 144 LBS

## 2018-06-22 DIAGNOSIS — R87.810 CERVICAL HIGH RISK HPV (HUMAN PAPILLOMAVIRUS) TEST POSITIVE: Primary | ICD-10-CM

## 2018-06-22 PROCEDURE — 88305 TISSUE EXAM BY PATHOLOGIST: CPT | Performed by: OBSTETRICS & GYNECOLOGY

## 2018-06-22 PROCEDURE — 57456 ENDOCERV CURETTAGE W/SCOPE: CPT | Performed by: OBSTETRICS & GYNECOLOGY

## 2018-06-22 NOTE — MR AVS SNAPSHOT
"              After Visit Summary   6/22/2018    Ashlee Acevedo    MRN: [id number]           Patient Information     Date Of Birth          1980        Visit Information        Provider Department      6/22/2018 8:30 AM Javi Pierson MD; WE COLPOSCOPE 1 [facility]        Today's Diagnoses     Cervical high risk HPV (human papillomavirus) test positive    -  1       Follow-ups after your visit        Who to contact     If you have questions or need follow up information about today's clinic visit or your schedule please contact [facility] directly at [phone].  Normal or non-critical lab and imaging results will be communicated to you by MyChart, letter or phone within 4 business days after the clinic has received the results. If you do not hear from us within 7 days, please contact the clinic through MyChart or phone. If you have a critical or abnormal lab result, we will notify you by phone as soon as possible.  Submit refill requests through [employer] or call your pharmacy and they will forward the refill request to us. Please allow 3 business days for your refill to be completed.          Additional Information About Your Visit        Care EveryWhere ID     This is your Care EveryWhere ID. This could be used by other organizations to access your [facility] medical records  [id number]        Your Vitals Were     Height BMI (Body Mass Index)                5' 8\" (1.727 m) 21.9 kg/m2           Blood Pressure from Last 3 Encounters:   06/22/18 100/60   03/20/18 110/76   02/02/18 110/73    Weight from Last 3 Encounters:   06/22/18 144 lb (65.3 kg)   03/20/18 155 lb (70.3 kg)   01/31/18 170 lb (77.1 kg)              We Performed the Following     COLPOSCOPY CERVIX/UPPER VAGINA W BX CERVIX     Surgical pathology exam        Primary Care Provider Office Phone # Fax #    Javi Pierson [phone] [address]  [place] " [address]        Equal Access to Services     [facility]: Hadii aad ku haddashmateo Gomez, waradhashelli stjoeyha, qaluislisa villaseñorgretakarissa brownlee. So [facility] [phone].    ATENCIÓN: Si habla español, tiene a abrams disposición servicios gratuitos de asistencia lingüística. Llame al [phone].    We comply with applicable federal civil rights laws and [place] laws. We do not discriminate on the basis of race, color, national origin, age, disability, sex, sexual orientation, or gender identity.            Thank you!     Thank you for choosing [facility]  for your care. Our goal is always to provide you with excellent care. Hearing back from our patients is one way we can continue to improve our services. Please take a few minutes to complete the written survey that you may receive in the mail after your visit with us. Thank you!             Your Updated Medication List - Protect others around you: Learn how to safely use, store and throw away your medicines at www.disposemymeds.org.          This list is accurate as of 6/22/18  9:17 AM.  Always use your most recent med list.                   Brand Name Dispense Instructions for use Diagnosis    prenatal multivitamin plus iron 27-0.8 MG Tabs per tablet     100 tablet    Take 1 tablet by mouth daily

## 2018-06-22 NOTE — PROGRESS NOTES
INDICATIONS:                                                        Ashlee Acevedo, is a 37 year old female, who had a recent negative pap, positive other HR-HPV.  Patient has prior history of abnormal paps. Here today for colposcopy. Discussed indication, risks of infection and bleeding.    Her last pap was   Lab Results   Component Value Date    PAP NIL 03/20/2018     Is a pregnancy test required: No  Was a consent obtained?  Yes      PROCEDURE:                                                      Cervix is stained with acetic acid and viewed colposcopically. Squamocolumnar junction is visualized in it's entirity. no visible lesions . Biopsy done No. Endocervical curretage Done             POST PROCEDURE:                                                      IMPRESSION: Patient tolerated procedure well and Normal cervix    PLAN : Await the results of the biopsies.  Repeat pap in 12 months.  She  tolerated the procedure well. There were no complications. Patient was discharged in stable condition.    Patient advised to call the clinic if excessive bleeding, pelvic pain, or fever.     Follow-up plan based on pathology results.    Javi Pierson MD

## 2018-06-25 LAB — COPATH REPORT: NORMAL

## 2019-06-24 ENCOUNTER — OFFICE VISIT (OUTPATIENT)
Dept: OBGYN | Facility: CLINIC | Age: 39
End: 2019-06-24
Payer: COMMERCIAL

## 2019-06-24 VITALS
BODY MASS INDEX: 22.1 KG/M2 | WEIGHT: 145.8 LBS | DIASTOLIC BLOOD PRESSURE: 62 MMHG | HEIGHT: 68 IN | SYSTOLIC BLOOD PRESSURE: 106 MMHG | HEART RATE: 68 BPM

## 2019-06-24 DIAGNOSIS — Z01.419 ENCOUNTER FOR GYNECOLOGICAL EXAMINATION WITHOUT ABNORMAL FINDING: Primary | ICD-10-CM

## 2019-06-24 PROCEDURE — 87624 HPV HI-RISK TYP POOLED RSLT: CPT | Performed by: NURSE PRACTITIONER

## 2019-06-24 PROCEDURE — 88175 CYTOPATH C/V AUTO FLUID REDO: CPT | Performed by: NURSE PRACTITIONER

## 2019-06-24 PROCEDURE — 99395 PREV VISIT EST AGE 18-39: CPT | Performed by: NURSE PRACTITIONER

## 2019-06-24 ASSESSMENT — ANXIETY QUESTIONNAIRES
GAD7 TOTAL SCORE: 0
2. NOT BEING ABLE TO STOP OR CONTROL WORRYING: NOT AT ALL
1. FEELING NERVOUS, ANXIOUS, OR ON EDGE: NOT AT ALL
3. WORRYING TOO MUCH ABOUT DIFFERENT THINGS: NOT AT ALL
5. BEING SO RESTLESS THAT IT IS HARD TO SIT STILL: NOT AT ALL
6. BECOMING EASILY ANNOYED OR IRRITABLE: NOT AT ALL
7. FEELING AFRAID AS IF SOMETHING AWFUL MIGHT HAPPEN: NOT AT ALL
IF YOU CHECKED OFF ANY PROBLEMS ON THIS QUESTIONNAIRE, HOW DIFFICULT HAVE THESE PROBLEMS MADE IT FOR YOU TO DO YOUR WORK, TAKE CARE OF THINGS AT HOME, OR GET ALONG WITH OTHER PEOPLE: NOT DIFFICULT AT ALL

## 2019-06-24 ASSESSMENT — MIFFLIN-ST. JEOR: SCORE: 1394.84

## 2019-06-24 ASSESSMENT — PATIENT HEALTH QUESTIONNAIRE - PHQ9
SUM OF ALL RESPONSES TO PHQ QUESTIONS 1-9: 0
5. POOR APPETITE OR OVEREATING: NOT AT ALL

## 2019-06-24 NOTE — PROGRESS NOTES
Ashlee is a 38 year old  female who presents for annual exam.     Besides routine health maintenance, she has no other health concerns today .    HPI: here for annual exam.   Last pap in  was negative, positive for other HPV.  She recently has had blood when wiping after IC.  She denies any pain with IC.    She is still breastfeeding, her 1 year old is having trouble with swallowing solids. She has had esophogram and now going to have a endoscopy.  Has had a cycle in May, nothing since.  Using condoms.    The patient's PCP is  Javi Pierson MD.        GYNECOLOGIC HISTORY:    Patient's last menstrual period was 05/15/2019.  Her current contraception method is: condoms.  She  reports that she has never smoked. She has never used smokeless tobacco.    Patient is sexually active.  STD testing offered?  Declined  Last PHQ-9 score on record =   PHQ-9 SCORE 2019   PHQ-9 Total Score 0     Last GAD7 score on record =   SHAWNEE-7 SCORE 2019   Total Score 0     Alcohol Score = 1    HEALTH MAINTENANCE:  Cholesterol: (No results found for: CHOL   Last Mammo: NA, Result: not applicable, Next Mammo: 3 years.  Pap: 3/20/2018 NIL, Other HPV+  Lab Results   Component Value Date    PAP NIL 2018    PAP ASC-US 2017    PAP ASC-US 2016    Colonoscopy:  NA, Result: not applicable, Next Colonoscopy: 13 years.  Dexa:  NA    Health maintenance updated:  yes    HISTORY:  OB History    Para Term  AB Living   3 3 3 0 0 3   SAB TAB Ectopic Multiple Live Births   0 0 0 0 3      # Outcome Date GA Lbr Regige/2nd Weight Sex Delivery Anes PTL Lv   3 Term 18 39w1d / 00:19 3.29 kg (7 lb 4.1 oz) F Vag-Spont None N MATTHEW      Name: LANA DONAHUE      Apgar1: 8  Apgar5: 9   2 Term 16 37w6d 03:27 / 00:15 3.74 kg (8 lb 3.9 oz) F   N MATTHEW      Apgar1: 8  Apgar5: 9   1 Term 14 39w0d 07:15 / 05:12 3.1 kg (6 lb 13.4 oz) F Vag-Spont EPI  MATTHEW      Name: LANA DONAHUE M      Apgar1:  "9  Apgar5: 9       Patient Active Problem List   Diagnosis     Indication for care in labor or delivery     Hyperemesis     Other normal pregnancy, not first, third trimester     ASCUS with positive high risk HPV cervical     Vaginal delivery     Past Surgical History:   Procedure Laterality Date     COLPOSCOPY CERVIX, BIOPSY CERVIX, ENDOCERVICAL CURETTAGE, COMBINED  4/2012    bx and ECG neg after initial LGSIL , + HPV     HC REMOVAL OF OVARIAN CYST(S)  1996     LAPAROSCOPIC APPENDECTOMY CHILD  2000     wisdom teeth        Social History     Tobacco Use     Smoking status: Never Smoker     Smokeless tobacco: Never Used   Substance Use Topics     Alcohol use: Yes     Alcohol/week: 0.0 oz     Comment: Occas       Problem (# of Occurrences) Relation (Name,Age of Onset)    Hyperlipidemia (3) Father, Maternal Grandfather, Paternal Grandmother    Hypertension (1) Paternal Grandmother    Parkinsonism (1) Father            No current outpatient medications on file.     No current facility-administered medications for this visit.      No Known Allergies    Past medical, surgical, social and family histories were reviewed and updated in EPIC.    ROS:   12 point review of systems negative other than symptoms noted below.    EXAM:  /62   Pulse 68   Ht 1.727 m (5' 8\")   Wt 66.1 kg (145 lb 12.8 oz)   LMP 05/15/2019   Breastfeeding? Yes   BMI 22.17 kg/m     BMI: Body mass index is 22.17 kg/m .    PHYSICAL EXAM:  Constitutional:  Appearance: Well nourished, well developed, alert, in no acute distress  Neck:  Lymph Nodes:  No lymphadenopathy present    Thyroid:  Gland size normal, nontender, no nodules or masses present  on palpation  Chest:  Respiratory Effort:  Breathing unlabored  Cardiovascular:    Heart: Auscultation:  Regular rate, normal rhythm, no murmurs present  Breasts: Inspection of Breasts:  No lymphadenopathy present., Palpation of Breasts and Axillae:  No masses present on palpation, no breast tenderness., " Axillary Lymph Nodes:  No lymphadenopathy present. and No nodularity, asymmetry or nipple discharge bilaterally.  Gastrointestinal:   Abdominal Examination:  Abdomen nontender to palpation, tone normal without rigidity or guarding, no masses present, umbilicus without lesions   Liver and Spleen:  No hepatomegaly present, liver nontender to palpation    Hernias:  No hernias present  Lymphatic: Lymph Nodes:  No other lymphadenopathy present  Skin:  General Inspection:  No rashes present, no lesions present, no areas of  discoloration    Genitalia and Groin:  No rashes present, no lesions present, no areas of  discoloration, no masses present  Neurologic/Psychiatric:    Mental Status:  Oriented X3     Pelvic Exam:  External Genitalia:     Normal appearance for age, no discharge present, no tenderness present, no inflammatory lesions present, color normal  Vagina:     Normal vaginal vault without central or paravaginal defects, no discharge present, no inflammatory lesions present, no masses present  Bladder:     Nontender to palpation  Urethra:   Urethral Body:  Urethra palpation normal, urethra structural support normal   Urethral Meatus:  No erythema or lesions present  Cervix:     Appearance healthy, no lesions present, nontender to palpation, no bleeding present  Uterus:     Uterus: firm, normal sized and nontender, anteverted in position.   Adnexa:     No adnexal tenderness present, no adnexal masses present  Perineum:     Perineum within normal limits, no evidence of trauma, no rashes or skin lesions present  Anus:     Anus within normal limits, no hemorrhoids present  Inguinal Lymph Nodes:     No lymphadenopathy present  Pubic Hair:     Normal pubic hair distribution for age  Genitalia and Groin:     No rashes present, no lesions present, no areas of discoloration, no masses present      COUNSELING:   Reviewed preventive health counseling, as reflected in patient instructions       Regular exercise       Healthy  diet/nutrition       Contraception    BMI: Body mass index is 22.17 kg/m .       ASSESSMENT:  38 year old female with satisfactory annual exam.    ICD-10-CM    1. Encounter for gynecological examination without abnormal finding Z01.419 Pap imaged thin layer screen with HPV - recommended age 30 - 65     HPV High Risk Types DNA Cervical       PLAN:  Normal Gyn exam.  If bleeding continues after IC, discussed returning for pelvic US r/o polyp.  Return prn or 1 year for annual.    OCTAVIO Overton CNP

## 2019-06-25 ASSESSMENT — ANXIETY QUESTIONNAIRES: GAD7 TOTAL SCORE: 0

## 2019-06-26 LAB
COPATH REPORT: NORMAL
PAP: NORMAL

## 2019-07-30 ENCOUNTER — TELEPHONE (OUTPATIENT)
Dept: OBGYN | Facility: CLINIC | Age: 39
End: 2019-07-30

## 2019-07-30 NOTE — TELEPHONE ENCOUNTER
Pt cancelled her appointment this morning for a colposcopy. Please call pt back to rescheduled procedure.      Thank you,   Jensen DE LEON   Central Scheduling  [phone]

## 2019-08-20 NOTE — PROGRESS NOTES
INDICATIONS:                                                    Is a pregnancy test required: No.  Was a consent obtained?  Yes    Ashlee Acevedo, is a 39 year old female, who had a recent neg pap.  HPV positive.  Yes prior history of abnormal pap. Here today for colposcopy. Discussed indication, risks of infection and bleeding.    Her last pap was   Lab Results   Component Value Date    PAP NIL, HPV OTHER HR+ 06/24/2019    .    PROCEDURE:                                                      Cervix is stained with acetic acid and viewed colposcopically. Squamocolumnar junction is visualized in it's entirety. no visible lesions . Biopsy done No. Endocervical curretage Done         POST PROCEDURE:                                                      IMPRESSION: Patient tolerated procedure well and Normal cervix    PLAN : Await the results of the biopsies.  Repeat pap in 12 months.  She  tolerated the procedure well. There were no complications. Patient was discharged in stable condition.    Patient advised to call the clinic if excessive bleeding, pelvic pain, or fever.     Follow-up plan based on pathology results.    Javi Pierson MD

## 2019-09-06 ENCOUNTER — OFFICE VISIT (OUTPATIENT)
Dept: OBGYN | Facility: CLINIC | Age: 39
End: 2019-09-06
Payer: COMMERCIAL

## 2019-09-06 VITALS — WEIGHT: 144.8 LBS | DIASTOLIC BLOOD PRESSURE: 68 MMHG | BODY MASS INDEX: 22.02 KG/M2 | SYSTOLIC BLOOD PRESSURE: 100 MMHG

## 2019-09-06 DIAGNOSIS — Z23 NEED FOR PROPHYLACTIC VACCINATION AND INOCULATION AGAINST INFLUENZA: ICD-10-CM

## 2019-09-06 DIAGNOSIS — R87.810 CERVICAL HIGH RISK HPV (HUMAN PAPILLOMAVIRUS) TEST POSITIVE: Primary | ICD-10-CM

## 2019-09-06 DIAGNOSIS — Z01.812 PRE-PROCEDURE LAB EXAM: ICD-10-CM

## 2019-09-06 LAB — HCG UR QL: NEGATIVE

## 2019-09-06 PROCEDURE — 88305 TISSUE EXAM BY PATHOLOGIST: CPT | Performed by: OBSTETRICS & GYNECOLOGY

## 2019-09-06 PROCEDURE — 57456 ENDOCERV CURETTAGE W/SCOPE: CPT | Performed by: OBSTETRICS & GYNECOLOGY

## 2019-09-06 PROCEDURE — 81025 URINE PREGNANCY TEST: CPT | Performed by: OBSTETRICS & GYNECOLOGY

## 2019-09-10 LAB — COPATH REPORT: NORMAL

## 2020-05-06 ENCOUNTER — VIRTUAL VISIT (OUTPATIENT)
Dept: FAMILY MEDICINE | Facility: CLINIC | Age: 40
End: 2020-05-06
Payer: COMMERCIAL

## 2020-05-06 DIAGNOSIS — M54.2 SORE NECK: ICD-10-CM

## 2020-05-06 DIAGNOSIS — R50.9 FEVER, UNSPECIFIED FEVER CAUSE: ICD-10-CM

## 2020-05-06 DIAGNOSIS — H69.93 DYSFUNCTION OF BOTH EUSTACHIAN TUBES: ICD-10-CM

## 2020-05-06 DIAGNOSIS — J02.9 SORE THROAT: ICD-10-CM

## 2020-05-06 DIAGNOSIS — R09.82 POST-NASAL DRIP: ICD-10-CM

## 2020-05-06 DIAGNOSIS — H92.03 OTALGIA OF BOTH EARS: Primary | ICD-10-CM

## 2020-05-06 PROCEDURE — 99203 OFFICE O/P NEW LOW 30 MIN: CPT | Mod: 95 | Performed by: INTERNAL MEDICINE

## 2020-05-06 RX ORDER — AMOXICILLIN 875 MG
875 TABLET ORAL 2 TIMES DAILY
Qty: 20 TABLET | Refills: 0 | Status: SHIPPED | OUTPATIENT
Start: 2020-05-06 | End: 2020-10-22

## 2020-05-06 NOTE — PROGRESS NOTES
"Ashlee Acevedo is a 39 year old female who is being evaluated via a billable video visit.      The patient has been notified of following:     \"This video visit will be conducted via a call between you and your physician/provider. We have found that certain health care needs can be provided without the need for an in-person physical exam.  This service lets us provide the care you need with a video conversation.  If a prescription is necessary we can send it directly to your pharmacy.  If lab work is needed we can place an order for that and you can then stop by our lab to have the test done at a later time.    Video visits are billed at different rates depending on your insurance coverage.  Please reach out to your insurance provider with any questions.    If during the course of the call the physician/provider feels a video visit is not appropriate, you will not be charged for this service.\"    Patient has given verbal consent for Video visit? Yes    How would you like to obtain your AVS? Bridgette    Patient would like the video invitation sent by: Text to cell phone: [phone]    Will anyone else be joining your video visit? No        Subjective     Ashlee Acevedo is a 39 year old female who presents to clinic today for the following health issues:    HPI      Video Start Time: 2:09 PM  Patient requesting video call concerns of ear pain pressure, sensation throbbing pain, sensitivity to sound, low-grade fever 100.4 Fahrenheit chills.  She has been taking some ibuprofen.  Denies any rhinorrhea or nasal congestion.  She feels some sore throat.  She had sick contact a 1 week ago her 2-year-old had a strep throat.  Patient yesterday had headache all over her head, still throbbing and sensitivity to noise but not to light.  No cough or shortness of breath, she is not allergic to amoxicillin.  No vomiting or GI symptoms, no lethargy and no neck stiffness.  Patient describes in the beginning of April she had a " illness with fever chills and viral-like illness but she was not tested for COVID 19 at that time no sick family members at the time.    Patient Active Problem List   Diagnosis     Indication for care in labor or delivery     Hyperemesis     Other normal pregnancy, not first, third trimester     ASCUS with positive high risk HPV cervical     Vaginal delivery     Past Surgical History:   Procedure Laterality Date     COLPOSCOPY CERVIX, BIOPSY CERVIX, ENDOCERVICAL CURETTAGE, COMBINED  4/2012    bx and ECG neg after initial LGSIL , + HPV     HC REMOVAL OF OVARIAN CYST(S)  1996     LAPAROSCOPIC APPENDECTOMY CHILD  2000     wisdom teeth         Social History     Tobacco Use     Smoking status: Never Smoker     Smokeless tobacco: Never Used   Substance Use Topics     Alcohol use: Yes     Alcohol/week: 0.0 standard drinks     Comment: 2 drinks per week     Family History   Problem Relation Age of Onset     Hyperlipidemia Father      Parkinsonism Father      Hyperlipidemia Maternal Grandfather      Hyperlipidemia Paternal Grandmother      Hypertension Paternal Grandmother          Current Outpatient Medications   Medication Sig Dispense Refill     amoxicillin (AMOXIL) 875 MG tablet Take 1 tablet (875 mg) by mouth 2 times daily 20 tablet 0     No Known Allergies  No lab results found.   BP Readings from Last 3 Encounters:   09/06/19 100/68   06/24/19 106/62   06/22/18 100/60    Wt Readings from Last 3 Encounters:   09/06/19 65.7 kg (144 lb 12.8 oz)   06/24/19 66.1 kg (145 lb 12.8 oz)   06/22/18 65.3 kg (144 lb)                    Reviewed and updated as needed this visit by Provider  Tobacco  Allergies  Meds  Med Hx  Surg Hx  Soc Hx        Review of Systems   ROS COMP: Constitutional, HEENT, cardiovascular, pulmonary, gi and gu systems are negative, except as otherwise noted.      Objective    There were no vitals taken for this visit.  Estimated body mass index is 22.02 kg/m  as calculated from the following:     "Height as of 6/24/19: 1.727 m (5' 8\").    Weight as of 9/6/19: 65.7 kg (144 lb 12.8 oz).  Physical Exam     GENERAL: healthy, alert and no distress  EYES: Eyes grossly normal to inspection, conjunctivae and sclerae normal  RESP: no audible wheeze, cough, or visible cyanosis.  No visible retractions or increased work of breathing.  Able to speak fully in complete sentences.  NEURO: Cranial nerves grossly intact, mentation intact and speech normal  PSYCH: mentation appears normal, affect normal/bright, judgement and insight intact, normal speech and appearance well-groomed      Diagnostic Test Results:  Labs reviewed in Epic        Assessment & Plan   Problem List Items Addressed This Visit     None      Visit Diagnoses     Otalgia of both ears    -  Primary    Relevant Medications    amoxicillin (AMOXIL) 875 MG tablet    Fever, unspecified fever cause        Relevant Medications    amoxicillin (AMOXIL) 875 MG tablet    Sore throat        Relevant Medications    amoxicillin (AMOXIL) 875 MG tablet    Post-nasal drip        Dysfunction of both eustachian tubes        Sore neck               Advised patient differential could include eustachian tube dysfunction barotrauma or possible strep pharyngitis or possible viral illness.  Advised patient on using nasal decongestants, also will start her on amoxicillin 875 mg twice daily x10 days.  Advised if any worsening of symptoms headache, vomiting, neck stiffness, lethargy to be evaluated immediately for possible meningitis she does not have photophobia or neck stiffness currently.  No vomiting.  She does not have history of migraine headaches.  Could still be a viral illness; COVID19 still in the differential advised her to call [email or web address] for further screening and evaluation.  Keep well-hydrated.  All questions answered.    See Patient Instructions  Return in about 3 days (around 5/9/2020), or if symptoms worsen or fail to improve, for other.    Antonia Chacko, " MD  [facility]      Video-Visit Details    Type of service:  Video Visit    Video End Time:2:37 PM    Originating Location (pt. Location): Home    Distant Location (provider location):  [facility]     Platform used for Video Visit: AmWell    Return in about 3 days (around 5/9/2020), or if symptoms worsen or fail to improve, for other.       Antonia Chacko MD

## 2020-08-27 ENCOUNTER — PATIENT OUTREACH (OUTPATIENT)
Dept: OBGYN | Facility: CLINIC | Age: 40
End: 2020-08-27

## 2020-08-27 DIAGNOSIS — R87.810 ASCUS WITH POSITIVE HIGH RISK HPV CERVICAL: ICD-10-CM

## 2020-08-27 DIAGNOSIS — R87.610 ASCUS WITH POSITIVE HIGH RISK HPV CERVICAL: ICD-10-CM

## 2020-08-27 NOTE — LETTER
August 27, 2020      Ashlee JAKE Curtis  [address] [id number]      Dear Ms.Acevedo,      At [facility], your health and wellness is our primary concern. That is why we are following up on a colposcopy from 9/6/19. Your provider had recommended that you have a Pap smear and HPV test completed by 9/6/20. Our records do not show that this has been scheduled.    It is important to complete the follow up that your provider has suggested for you to ensure that there are no worsening changes which may, over time, develop into cancer.      Please contact our office at  [phone] to schedule an appointment for a Pap smear and HPV test at your earliest convenience. If you have questions or concerns, please call the clinic and we will be happy to assist you.    If you have completed the tests outside of [facility], please have the results forwarded to our office. We will update the chart for your primary Physician to review before your next annual physical.     Thank you for choosing [facility]!    Sincerely,      Your [facility] Care Team/nicci

## 2020-09-24 ENCOUNTER — PATIENT OUTREACH (OUTPATIENT)
Dept: OBGYN | Facility: CLINIC | Age: 40
End: 2020-09-24

## 2020-09-24 DIAGNOSIS — R87.810 ASCUS WITH POSITIVE HIGH RISK HPV CERVICAL: ICD-10-CM

## 2020-09-24 DIAGNOSIS — R87.610 ASCUS WITH POSITIVE HIGH RISK HPV CERVICAL: ICD-10-CM

## 2020-10-21 NOTE — PROGRESS NOTES
Ashlee is a 40 year old  female who presents for annual exam.     Besides routine health maintenance, she has no other health concerns today .    HPI:  The patient's PCP is  Javi Pierson MD.    Still nursing a little. Cycles normal.  Using condoms. Plans VAS  Hx of NIL pap HPV other. Negative colpo    GYNECOLOGIC HISTORY:    Patient's last menstrual period was 2020 (approximate).    Regular menses? yes  Menses every 28-30 days.  Length of menses: 4 days    Her current contraception method is: condoms.  She  reports that she has never smoked. She has never used smokeless tobacco.    Patient is sexually active.  STD testing offered?  to be discussed with MD  Last PHQ-9 score on record =   PHQ-9 SCORE 10/22/2020   PHQ-9 Total Score 1     Last GAD7 score on record =   SHAWNEE-7 SCORE 10/22/2020   Total Score 0     Alcohol Score = 2    HEALTH MAINTENANCE:  Cholesterol: long ago  Last Mammo: Not applicable, Result: Not applicable, Next Mammo: Due at age 41   Pap:   Lab Results   Component Value Date    PAP NIL HPVother+ 2019    PAP NIL 2018    PAP ASC-US 2017      Colonoscopy:  NA, Result: Not applicable, Next Colonoscopy: age 45-50 years.  Dexa:  NA    Health maintenance updated:  yes    HISTORY:  OB History    Para Term  AB Living   3 3 3 0 0 3   SAB TAB Ectopic Multiple Live Births   0 0 0 0 3      # Outcome Date GA Lbr Reggie/2nd Weight Sex Delivery Anes PTL Lv   3 Term 18 39w1d / 00:19 3.29 kg (7 lb 4.1 oz) F Vag-Spont None N MATTHEW      Name: LANA DONAHUE      Apgar1: 8  Apgar5: 9   2 Term 16 37w6d 03:27 / 00:15 3.74 kg (8 lb 3.9 oz) F   N MATTHEW      Apgar1: 8  Apgar5: 9   1 Term 14 39w0d 07:15 / 05:12 3.1 kg (6 lb 13.4 oz) F Vag-Spont EPI  MATTHEW      Name: LANA DONAHUE M      Apgar1: 9  Apgar5: 9       Patient Active Problem List   Diagnosis     Indication for care in labor or delivery     Hyperemesis     Other normal pregnancy, not first,  "third trimester     ASCUS with positive high risk HPV cervical     Vaginal delivery     Past Surgical History:   Procedure Laterality Date     COLPOSCOPY CERVIX, BIOPSY CERVIX, ENDOCERVICAL CURETTAGE, COMBINED  4/2012    bx and ECG neg after initial LGSIL , + HPV     HC REMOVAL OF OVARIAN CYST(S)  1996     LAPAROSCOPIC APPENDECTOMY CHILD  2000     wisdom teeth        Social History     Tobacco Use     Smoking status: Never Smoker     Smokeless tobacco: Never Used   Substance Use Topics     Alcohol use: Yes     Alcohol/week: 0.0 standard drinks     Comment: 2 drinks per week      Problem (# of Occurrences) Relation (Name,Age of Onset)    Hyperlipidemia (3) Father, Maternal Grandfather, Paternal Grandmother    Hypertension (1) Paternal Grandmother    Parkinsonism (1) Father            No current outpatient medications on file.     No current facility-administered medications for this visit.      No Known Allergies    Past medical, surgical, social and family histories were reviewed and updated in EPIC.    ROS:   12 point review of systems negative other than symptoms noted below or in the HPI.      EXAM:  /74   Pulse 80   Ht 1.727 m (5' 8\")   Wt 66.7 kg (147 lb)   LMP 09/30/2020 (Approximate)   BMI 22.35 kg/m     BMI: Body mass index is 22.35 kg/m .    PHYSICAL EXAM:  Constitutional:   Appearance: Well nourished, well developed, alert, in no acute distress  Neck:  Lymph Nodes:  No lymphadenopathy present    Thyroid:  Gland size normal, nontender, no nodules or masses present  on palpation  Chest:  Respiratory Effort:  Breathing unlabored  Cardiovascular:    Heart: Auscultation:  Regular rate, normal rhythm, no murmurs present  Breasts: Inspection of Breasts:  No lymphadenopathy present., Palpation of Breasts and Axillae:  No masses present on palpation, no breast tenderness., Axillary Lymph Nodes:  No lymphadenopathy present. and No nodularity, asymmetry or nipple discharge " bilaterally.  Gastrointestinal:   Abdominal Examination:  Abdomen nontender to palpation, tone normal without rigidity or guarding, no masses present, umbilicus without lesions   Liver and Spleen:  No hepatomegaly present, liver nontender to palpation    Hernias:  No hernias present  Lymphatic: Lymph Nodes:  No other lymphadenopathy present  Skin:  General Inspection:  No rashes present, no lesions present, no areas of  discoloration  Neurologic:    Mental Status:  Oriented X3.  Normal strength and tone, sensory exam                grossly normal, mentation intact and speech normal.    Psychiatric:   Mentation appears normal and affect normal/bright.         Pelvic Exam:  External Genitalia:     Normal appearance for age, no discharge present, no tenderness present, no inflammatory lesions present, color normal  Vagina:     Normal vaginal vault without central or paravaginal defects, no discharge present, no inflammatory lesions present, no masses present  Bladder:     Nontender to palpation  Urethra:   Urethral Body:  Urethra palpation normal, urethra structural support normal   Urethral Meatus:  No erythema or lesions present  Cervix:     Appearance healthy, no lesions present, nontender to palpation, no bleeding present  Uterus:     Uterus: firm, normal sized and nontender, anteverted in position.   Adnexa:     No adnexal tenderness present, no adnexal masses present  Perineum:     Perineum within normal limits, no evidence of trauma, no rashes or skin lesions present  Anus:     Anus within normal limits, no hemorrhoids present  Inguinal Lymph Nodes:     No lymphadenopathy present  Pubic Hair:     Normal pubic hair distribution for age  Genitalia and Groin:     No rashes present, no lesions present, no areas of discoloration, no masses present      COUNSELING:   Reviewed preventive health counseling, as reflected in patient instructions       Regular exercise    BMI: Body mass index is 22.35  kg/m .      ASSESSMENT:  40 year old female with satisfactory annual exam.    ICD-10-CM    1. Encounter for gynecological examination without abnormal finding  Z01.419 HPV High Risk Types DNA Cervical     Pap imaged thin layer screen with HPV - recommended age 30 - 65 years (select HPV order below)   2. Cervical high risk HPV (human papillomavirus) test positive  R87.810        PLAN:  If pap still normal and HPV other, repeat pap and HPV in a year.   Gave name for ABRAHAN Pierson MD

## 2020-10-22 ENCOUNTER — OFFICE VISIT (OUTPATIENT)
Dept: OBGYN | Facility: CLINIC | Age: 40
End: 2020-10-22
Payer: COMMERCIAL

## 2020-10-22 VITALS
WEIGHT: 147 LBS | SYSTOLIC BLOOD PRESSURE: 108 MMHG | HEART RATE: 80 BPM | BODY MASS INDEX: 22.28 KG/M2 | HEIGHT: 68 IN | DIASTOLIC BLOOD PRESSURE: 74 MMHG

## 2020-10-22 DIAGNOSIS — R87.810 CERVICAL HIGH RISK HPV (HUMAN PAPILLOMAVIRUS) TEST POSITIVE: ICD-10-CM

## 2020-10-22 DIAGNOSIS — Z01.419 ENCOUNTER FOR GYNECOLOGICAL EXAMINATION WITHOUT ABNORMAL FINDING: Primary | ICD-10-CM

## 2020-10-22 PROCEDURE — 99395 PREV VISIT EST AGE 18-39: CPT | Performed by: OBSTETRICS & GYNECOLOGY

## 2020-10-22 PROCEDURE — 88175 CYTOPATH C/V AUTO FLUID REDO: CPT | Performed by: OBSTETRICS & GYNECOLOGY

## 2020-10-22 PROCEDURE — 87624 HPV HI-RISK TYP POOLED RSLT: CPT | Performed by: OBSTETRICS & GYNECOLOGY

## 2020-10-22 ASSESSMENT — ANXIETY QUESTIONNAIRES
GAD7 TOTAL SCORE: 0
1. FEELING NERVOUS, ANXIOUS, OR ON EDGE: NOT AT ALL
7. FEELING AFRAID AS IF SOMETHING AWFUL MIGHT HAPPEN: NOT AT ALL
2. NOT BEING ABLE TO STOP OR CONTROL WORRYING: NOT AT ALL
5. BEING SO RESTLESS THAT IT IS HARD TO SIT STILL: NOT AT ALL
3. WORRYING TOO MUCH ABOUT DIFFERENT THINGS: NOT AT ALL
6. BECOMING EASILY ANNOYED OR IRRITABLE: NOT AT ALL

## 2020-10-22 ASSESSMENT — PATIENT HEALTH QUESTIONNAIRE - PHQ9
5. POOR APPETITE OR OVEREATING: NOT AT ALL
SUM OF ALL RESPONSES TO PHQ QUESTIONS 1-9: 1

## 2020-10-22 ASSESSMENT — MIFFLIN-ST. JEOR: SCORE: 1390.29

## 2020-10-22 NOTE — LETTER
October 30, 2020      Ashlee Acevedo  [address]  [place] [address]        Dear MsRoyceAcevedo,    We are happy to inform you that your recent Pap smear and Human Papillomavirus (HPV) test results are normal and negative.    It is recommended that you have your next Pap smear and Human Papillomavirus (HPV) test in 3 years. You will also need to return to the clinic every year for an annual wellness visit.    If you have additional questions regarding this result, please contact our office and we will be happy to assist you.      Sincerely,    Your [facility] Care Team

## 2020-10-23 ASSESSMENT — ANXIETY QUESTIONNAIRES: GAD7 TOTAL SCORE: 0

## 2020-10-26 LAB
COPATH REPORT: NORMAL
PAP: NORMAL

## 2020-10-28 LAB
FINAL DIAGNOSIS: NORMAL
HPV HR 12 DNA CVX QL NAA+PROBE: NEGATIVE
HPV16 DNA SPEC QL NAA+PROBE: NEGATIVE
HPV18 DNA SPEC QL NAA+PROBE: NEGATIVE
SPECIMEN DESCRIPTION: NORMAL
SPECIMEN SOURCE CVX/VAG CYTO: NORMAL

## 2020-11-22 ENCOUNTER — HEALTH MAINTENANCE LETTER (OUTPATIENT)
Age: 40
End: 2020-11-22

## 2021-02-26 ENCOUNTER — OFFICE VISIT (OUTPATIENT)
Dept: MIDWIFE SERVICES | Facility: CLINIC | Age: 41
End: 2021-02-26
Payer: COMMERCIAL

## 2021-02-26 VITALS
HEIGHT: 68 IN | SYSTOLIC BLOOD PRESSURE: 112 MMHG | DIASTOLIC BLOOD PRESSURE: 62 MMHG | BODY MASS INDEX: 22.97 KG/M2 | WEIGHT: 151.6 LBS

## 2021-02-26 DIAGNOSIS — T19.2XXA RETAINED TAMPON, INITIAL ENCOUNTER: Primary | ICD-10-CM
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
## 2021-02-26 PROCEDURE — 99212 OFFICE O/P EST SF 10 MIN: CPT | Performed by: ADVANCED PRACTICE MIDWIFE

## 2021-02-26 ASSESSMENT — MIFFLIN-ST. JEOR: SCORE: 1411.15

## 2021-02-26 NOTE — PROGRESS NOTES
SUBJECTIVE:                                                   Ashlee Acevedo is a 40 year old who presents to clinic today for the following health issue(s):  Patient presents with:  Vaginal Problem: possible retained tampon      HPI:  Here today for concerns of having a retained tampon.   Thought she changed her pad and placed a new tampon in at 1930 last night.   Woke in the middle of the night thinking she left her tampon in longer than usual, but did not get up to use the bathroom  Woke in the morning to remove tampon and could not find it.     Patient's last menstrual period was 2021 (approximate).  Menstrual History: frequency: every 28-30 days  Patient is sexually active  .  Using none for contraception.   Health maintenance updated:  no  STI infx testing offered:  Declined    Last PHQ-9 score on record =   PHQ-9 SCORE 10/22/2020   PHQ-9 Total Score 1     Last GAD7 score on record =   SHAWNEE-7 SCORE 10/22/2020   Total Score 0         Problem list and histories reviewed & adjusted, as indicated.  Additional history: as documented.    Patient Active Problem List   Diagnosis     Indication for care in labor or delivery     Hyperemesis     Other normal pregnancy, not first, third trimester     ASCUS with positive high risk HPV cervical     Vaginal delivery     Past Surgical History:   Procedure Laterality Date     COLPOSCOPY CERVIX, BIOPSY CERVIX, ENDOCERVICAL CURETTAGE, COMBINED  2012    bx and ECG neg after initial LGSIL , + HPV     HC REMOVAL OF OVARIAN CYST(S)       LAPAROSCOPIC APPENDECTOMY CHILD  2000     wisdom teeth        Social History     Tobacco Use     Smoking status: Never Smoker     Smokeless tobacco: Never Used   Substance Use Topics     Alcohol use: Yes     Alcohol/week: 0.0 standard drinks     Comment: 2 drinks per week      Problem (# of Occurrences) Relation (Name,Age of Onset)    Hyperlipidemia (3) Father, Maternal Grandfather, Paternal Grandmother    Hypertension (1)  "Paternal Grandmother    Parkinsonism (1) Father            No current outpatient medications on file.     No current facility-administered medications for this visit.      No Known Allergies    ROS:  12 point review of systems negative other than symptoms noted below.    OBJECTIVE:     /62   Ht 1.727 m (5' 8\")   Wt 68.8 kg (151 lb 9.6 oz)   LMP 02/24/2021 (Approximate)   Breastfeeding Yes   BMI 23.05 kg/m    Body mass index is 23.05 kg/m .    PHYSICAL EXAM:  Constitutional:  Appearance: Well nourished, well developed alert, in no acute distress  Pelvic Exam:  External Genitalia:     Normal appearance for age, no discharge present, no tenderness present, no inflammatory lesions present, color normal  Vagina:     Normal vaginal vault without central or paravaginal defects, no discharge present, no inflammatory lesions present, no retained tampon found  Cervix:     Appearance healthy, no lesions present, nontender to palpation, currently on mentrual cycle    In-Clinic Test Results:  No results found for this or any previous visit (from the past 24 hour(s)).    ASSESSMENT/PLAN:                                                        ICD-10-CM    1. Retained tampon, initial encounter  T19.2XXA        COUNSELING:  Discussed findings on speculum exam/bimanual. No tampon found  Continue to monitor for any vaginal odor  RTC to clinic with further concerns.       15 minutes spent on the date of the encounter doing chart review, history and exam, documentation and further activities as noted above    Valorie CUNNINGHAM CNM      "

## 2021-07-27 ENCOUNTER — RECORDS - HEALTHEAST (OUTPATIENT)
Dept: ADMINISTRATIVE | Facility: CLINIC | Age: 41
End: 2021-07-27

## 2021-09-19 ENCOUNTER — HEALTH MAINTENANCE LETTER (OUTPATIENT)
Age: 41
End: 2021-09-19

## 2022-01-09 ENCOUNTER — HEALTH MAINTENANCE LETTER (OUTPATIENT)
Age: 42
End: 2022-01-09

## 2022-03-25 RX ORDER — JNJ-78436735 50000000000 [PFU]/.5ML
SUSPENSION INTRAMUSCULAR
COMMUNITY
Start: 2021-04-07 | End: 2022-03-28

## 2022-03-25 NOTE — PROGRESS NOTES
Ashlee is a 41 year old  female who presents for annual exam.     Besides routine health maintenance, she would like to discuss non-hormonal birth control options. She is not fasting today but would like to return for lab work and she is due for baseline mammogram.  HPI:  Here for annual exam. Feels like she has been focusing on reproductive health for a while, has either been pregnant or nursing since , stopped nursing her youngest in July. Wants to start focusing on preventative health recommendations.   Getting regular periods every month, lasting 4-5 days. First day is always the heaviest, will have some small clots, changes a tampon every 4 hours or so. After first day the flow is moderate to light.   Would like to discuss non-hormonal forms of birth control.  was planning to get a vasectomy but recently had hernia surgery and is hesitant to have another procedure performed. Ashlee reports she has been on multiple forms of hormonal birth control from age [age] and would really like to avoid anything hormonal.   Has not had baseline mammogram yet.   Pap record reviewed, due for pap co-test in 10/2023  Menses are regular q 28-30 days and normal lasting 4 -5days.   Menses flow: normal.    Patient's last menstrual period was 2022 (approximate)..   Using condoms for contraception.    She is not currently considering pregnancy.    REPRODUCTIVE/GYNECOLOGIC HISTORY:  Ashlee is sexually active with male partner(s) and is currently in monogamous relationship.   STI testing offered?  Declined  History of abnormal Pap smear:  Yes  SOCIAL HISTORY  Abuse: does not report having previously been physical or sexually abused.    Do you feel safe in your environment? YES      She  reports that she has never smoked. She has never used smokeless tobacco.      Last PHQ-9 score on record =   PHQ-9 SCORE 3/28/2022   PHQ-9 Total Score 0     Last GAD7 score on record =   SHAWNEE-7 SCORE 3/28/2022   Total Score 0      Alcohol Score = 2    HEALTH MAINTENANCE:  Cholesterol: (No results found for: CHOL History of abnormal lipids: N/A  Mammo: needs baseline  Regular Self Breast Exams: No  TSH: (No results found for: TSH )  Pap;   Lab Results   Component Value Date    PAP NIL, NEG-HPV 10/22/2020    PAP NIL, +HR-HPV 2019    PAP NIL, +HR-HPV 2018   Immunizations up to date: no record of Gardasil  (Gardasil, Tdap, Flu)  Health maintenance updated:  Needs mammo    HISTORY:  OB History    Para Term  AB Living   3 3 3 0 0 3   SAB IAB Ectopic Multiple Live Births   0 0 0 0 3      # Outcome Date GA Lbr Reggie/2nd Weight Sex Delivery Anes PTL Lv   3 Term 18 39w1d / 00:19 3.29 kg (7 lb 4.1 oz) F Vag-Spont None N MATTHEW      Name: LANA DONAHUE      Apgar1: 8  Apgar5: 9   2 Term /16 37w6d 03:27 / 00:15 3.74 kg (8 lb 3.9 oz) F   N MATTHEW      Apgar1: 8  Apgar5: 9   1 Term / 39w0d 07:15 / 05:12 3.1 kg (6 lb 13.4 oz) F Vag-Spont EPI  MATTHEW      Name: LANA DONAHUE TISHA M      Apgar1: 9  Apgar5: 9     Past Medical History:   Diagnosis Date     ASCUS with positive high risk HPV cervical 2016    ASCUS, +HR HPV. [place] in 2016 JENIFFER 1     History of cold sores     Orally only. Did have one on her breast as well.     HPV (human papilloma virus) infection      Threatened       Vaginitis      Past Surgical History:   Procedure Laterality Date     COLPOSCOPY CERVIX, BIOPSY CERVIX, ENDOCERVICAL CURETTAGE, COMBINED  2012    bx and ECG neg after initial LGSIL , + HPV     HC REMOVAL OF OVARIAN CYST(S)       LAPAROSCOPIC APPENDECTOMY CHILD  2000     wisdom teeth       Family History   Problem Relation Age of Onset     Hyperlipidemia Father      Parkinsonism Father      Hyperlipidemia Maternal Grandfather      Hyperlipidemia Paternal Grandmother      Hypertension Paternal Grandmother      Social History     Socioeconomic History     Marital status:      Spouse name: Ashley     Number of  "children: 1     Years of education: Not on file     Highest education level: Not on file   Occupational History     Not on file   Tobacco Use     Smoking status: Never Smoker     Smokeless tobacco: Never Used   Substance and Sexual Activity     Alcohol use: Yes     Alcohol/week: 0.0 standard drinks     Comment: 2 drinks per week     Drug use: Never     Sexual activity: Yes     Partners: Male     Birth control/protection: Condom     Comment: premenopausal, LMP 5-   Other Topics Concern     Parent/sibling w/ CABG, MI or angioplasty before 65F 55M? Not Asked   Social History Narrative     Not on file     Social Determinants of Health     Financial Resource Strain: Not on file   Food Insecurity: Not on file   Transportation Needs: Not on file   Physical Activity: Not on file   Stress: Not on file   Social Connections: Not on file   Intimate Partner Violence: Not on file   Housing Stability: Not on file     No current outpatient medications on file.   No Known Allergies    Past medical, surgical, social and family history were reviewed and updated in EPIC.    ROS:   12 point review of systems negative other than symptoms noted below or in the HPI.    PHYSICAL EXAM:  /60   Ht 1.727 m (5' 8\")   Wt 68.5 kg (151 lb)   LMP 03/07/2022 (Approximate)   Breastfeeding No   BMI 22.96 kg/m     BMI: Body mass index is 22.96 kg/m .  Constitutional: healthy, alert and no distress  Neck: symmetrical, thyroid normal size, no masses present, no lymphadenopathy present.   Cardiovascular: RRR, no murmurs present  Respiratory: breathing unlabored, lungs CTA bilaterally  Breast:normal without masses, tenderness or nipple discharge and no palpable axillary masses or adenopathy  Gastrointestinal: abdomen soft, non-tender, bowel sounds present  PELVIC EXAM:  Vulva: No lesions, no adenopathy, BUS WNL  Vagina: Moist, pink, discharge normal  well rugated, no lesions  Cervix:smooth, pink, no visible lesions, pap collected   Uterus: " Normal size, non-tender, mobile  Ovaries: No masses palpated  Rectal exam: deferred    ASSESSMENT/PLAN:    ICD-10-CM    1. Encounter for gynecological examination without abnormal finding  Z01.419 CBC with platelets     Comprehensive metabolic panel   2. Screening, lipid  Z13.220 Lipid panel reflex to direct LDL Fasting   3. Screening for thyroid disorder  Z13.29 TSH with free T4 reflex     No results found for any visits on 03/28/22.      COUNSELING:   Reviewed preventive health counseling, as reflected in patient instructions       Regular exercise       Healthy diet/nutrition       Contraception   reports that she has never smoked. She has never used smokeless tobacco.    -Reviewed non-hormonal birth control options (condoms, Paragard, diaphragm). Reviewed Paragard r/b, side effects in detail and handout was provided. Also briefly discussed tubal ligation as option if interested in permanent method of sterilization, which she is not at this time.   -Needs to schedule mammogram, she will do today when she leaves.   -TSH, CBC, CMP, fasting lipids future ordered, she will have labs drawn when she returns for mammogram.  -Due for pap/cotest in 10/2023  -RTC in 1 year for annual exam or sooner is problems arise    OCTAVIO Sebastian, AGUEDAM

## 2022-03-28 ENCOUNTER — OFFICE VISIT (OUTPATIENT)
Dept: MIDWIFE SERVICES | Facility: CLINIC | Age: 42
End: 2022-03-28
Payer: COMMERCIAL

## 2022-03-28 VITALS
BODY MASS INDEX: 22.88 KG/M2 | DIASTOLIC BLOOD PRESSURE: 60 MMHG | WEIGHT: 151 LBS | SYSTOLIC BLOOD PRESSURE: 110 MMHG | HEIGHT: 68 IN

## 2022-03-28 DIAGNOSIS — Z13.29 SCREENING FOR THYROID DISORDER: ICD-10-CM

## 2022-03-28 DIAGNOSIS — Z01.419 ENCOUNTER FOR GYNECOLOGICAL EXAMINATION WITHOUT ABNORMAL FINDING: Primary | ICD-10-CM

## 2022-03-28 DIAGNOSIS — Z13.220 SCREENING, LIPID: ICD-10-CM

## 2022-03-28 PROCEDURE — 99396 PREV VISIT EST AGE 40-64: CPT | Performed by: ADVANCED PRACTICE MIDWIFE

## 2022-03-28 ASSESSMENT — PATIENT HEALTH QUESTIONNAIRE - PHQ9
SUM OF ALL RESPONSES TO PHQ QUESTIONS 1-9: 0
5. POOR APPETITE OR OVEREATING: NOT AT ALL

## 2022-03-28 ASSESSMENT — ANXIETY QUESTIONNAIRES
2. NOT BEING ABLE TO STOP OR CONTROL WORRYING: NOT AT ALL
3. WORRYING TOO MUCH ABOUT DIFFERENT THINGS: NOT AT ALL
GAD7 TOTAL SCORE: 0
5. BEING SO RESTLESS THAT IT IS HARD TO SIT STILL: NOT AT ALL
IF YOU CHECKED OFF ANY PROBLEMS ON THIS QUESTIONNAIRE, HOW DIFFICULT HAVE THESE PROBLEMS MADE IT FOR YOU TO DO YOUR WORK, TAKE CARE OF THINGS AT HOME, OR GET ALONG WITH OTHER PEOPLE: NOT DIFFICULT AT ALL
7. FEELING AFRAID AS IF SOMETHING AWFUL MIGHT HAPPEN: NOT AT ALL
6. BECOMING EASILY ANNOYED OR IRRITABLE: NOT AT ALL
1. FEELING NERVOUS, ANXIOUS, OR ON EDGE: NOT AT ALL

## 2022-03-29 ASSESSMENT — ANXIETY QUESTIONNAIRES: GAD7 TOTAL SCORE: 0

## 2022-04-12 ENCOUNTER — LAB (OUTPATIENT)
Dept: LAB | Facility: CLINIC | Age: 42
End: 2022-04-12
Payer: COMMERCIAL

## 2022-04-12 DIAGNOSIS — Z13.29 SCREENING FOR THYROID DISORDER: ICD-10-CM

## 2022-04-12 DIAGNOSIS — Z01.419 ENCOUNTER FOR GYNECOLOGICAL EXAMINATION WITHOUT ABNORMAL FINDING: ICD-10-CM

## 2022-04-12 DIAGNOSIS — Z13.220 SCREENING, LIPID: ICD-10-CM

## 2022-04-12 LAB
ALBUMIN SERPL-MCNC: 4 G/DL (ref 3.4–5)
ALP SERPL-CCNC: 62 U/L (ref 40–150)
ALT SERPL W P-5'-P-CCNC: 30 U/L (ref 0–50)
ANION GAP SERPL CALCULATED.3IONS-SCNC: 8 MMOL/L (ref 3–14)
AST SERPL W P-5'-P-CCNC: 18 U/L (ref 0–45)
BILIRUB SERPL-MCNC: 0.6 MG/DL (ref 0.2–1.3)
BUN SERPL-MCNC: 11 MG/DL (ref 7–30)
CALCIUM SERPL-MCNC: 8.8 MG/DL (ref 8.5–10.1)
CHLORIDE BLD-SCNC: 108 MMOL/L (ref 94–109)
CHOLEST SERPL-MCNC: 159 MG/DL
CO2 SERPL-SCNC: 23 MMOL/L (ref 20–32)
CREAT SERPL-MCNC: 0.62 MG/DL (ref 0.52–1.04)
ERYTHROCYTE [DISTWIDTH] IN BLOOD BY AUTOMATED COUNT: 13.1 % (ref 10–15)
FASTING STATUS PATIENT QL REPORTED: YES
GFR SERPL CREATININE-BSD FRML MDRD: >90 ML/MIN/1.73M2
GLUCOSE BLD-MCNC: 82 MG/DL (ref 70–99)
HCT VFR BLD AUTO: 38.8 % (ref 35–47)
HDLC SERPL-MCNC: 66 MG/DL
HGB BLD-MCNC: 12.5 G/DL (ref 11.7–15.7)
LDLC SERPL CALC-MCNC: 83 MG/DL
MCH RBC QN AUTO: 29.1 PG (ref 26.5–33)
MCHC RBC AUTO-ENTMCNC: 32.2 G/DL (ref 31.5–36.5)
MCV RBC AUTO: 90 FL (ref 78–100)
NONHDLC SERPL-MCNC: 93 MG/DL
PLATELET # BLD AUTO: 314 10E3/UL (ref 150–450)
POTASSIUM BLD-SCNC: 3.9 MMOL/L (ref 3.4–5.3)
PROT SERPL-MCNC: 7.2 G/DL (ref 6.8–8.8)
RBC # BLD AUTO: 4.29 10E6/UL (ref 3.8–5.2)
SODIUM SERPL-SCNC: 139 MMOL/L (ref 133–144)
TRIGL SERPL-MCNC: 51 MG/DL
TSH SERPL DL<=0.005 MIU/L-ACNC: 1.04 MU/L (ref 0.4–4)
WBC # BLD AUTO: 5.4 10E3/UL (ref 4–11)

## 2022-04-12 PROCEDURE — 80061 LIPID PANEL: CPT

## 2022-04-12 PROCEDURE — 36415 COLL VENOUS BLD VENIPUNCTURE: CPT

## 2022-04-12 PROCEDURE — 84443 ASSAY THYROID STIM HORMONE: CPT

## 2022-04-12 PROCEDURE — 85027 COMPLETE CBC AUTOMATED: CPT

## 2022-04-12 PROCEDURE — 80053 COMPREHEN METABOLIC PANEL: CPT

## 2022-11-20 ENCOUNTER — HEALTH MAINTENANCE LETTER (OUTPATIENT)
Age: 42
End: 2022-11-20

## 2023-06-02 ENCOUNTER — HEALTH MAINTENANCE LETTER (OUTPATIENT)
Age: 43
End: 2023-06-02

## 2024-02-03 ENCOUNTER — HEALTH MAINTENANCE LETTER (OUTPATIENT)
Age: 44
End: 2024-02-03

## 2024-06-22 ENCOUNTER — HEALTH MAINTENANCE LETTER (OUTPATIENT)
Age: 44
End: 2024-06-22

## 2025-02-05 ENCOUNTER — ANCILLARY PROCEDURE (OUTPATIENT)
Dept: MAMMOGRAPHY | Facility: CLINIC | Age: 45
End: 2025-02-05
Payer: COMMERCIAL

## 2025-02-05 ENCOUNTER — OFFICE VISIT (OUTPATIENT)
Dept: MIDWIFE SERVICES | Facility: CLINIC | Age: 45
End: 2025-02-05
Payer: COMMERCIAL

## 2025-02-05 VITALS
DIASTOLIC BLOOD PRESSURE: 68 MMHG | SYSTOLIC BLOOD PRESSURE: 114 MMHG | WEIGHT: 161.4 LBS | BODY MASS INDEX: 24.46 KG/M2 | HEIGHT: 68 IN

## 2025-02-05 DIAGNOSIS — N92.0 MENORRHAGIA WITH REGULAR CYCLE: ICD-10-CM

## 2025-02-05 DIAGNOSIS — Z30.430 ENCOUNTER FOR INSERTION OF INTRAUTERINE CONTRACEPTIVE DEVICE: Primary | ICD-10-CM

## 2025-02-05 DIAGNOSIS — Z12.4 SCREENING FOR CERVICAL CANCER: ICD-10-CM

## 2025-02-05 DIAGNOSIS — Z12.31 VISIT FOR SCREENING MAMMOGRAM: ICD-10-CM

## 2025-02-05 DIAGNOSIS — Z97.5 IUD (INTRAUTERINE DEVICE) IN PLACE: ICD-10-CM

## 2025-02-05 PROCEDURE — 77063 BREAST TOMOSYNTHESIS BI: CPT | Mod: TC | Performed by: RADIOLOGY

## 2025-02-05 PROCEDURE — 99459 PELVIC EXAMINATION: CPT | Performed by: ADVANCED PRACTICE MIDWIFE

## 2025-02-05 PROCEDURE — 58300 INSERT INTRAUTERINE DEVICE: CPT | Performed by: ADVANCED PRACTICE MIDWIFE

## 2025-02-05 PROCEDURE — 87624 HPV HI-RISK TYP POOLED RSLT: CPT | Performed by: ADVANCED PRACTICE MIDWIFE

## 2025-02-05 PROCEDURE — 77067 SCR MAMMO BI INCL CAD: CPT | Mod: TC | Performed by: RADIOLOGY

## 2025-02-05 PROCEDURE — 99396 PREV VISIT EST AGE 40-64: CPT | Mod: 25 | Performed by: ADVANCED PRACTICE MIDWIFE

## 2025-02-05 NOTE — NURSING NOTE
"Chief Complaint   Patient presents with    Physical     Last pap was 10/22/2020- NIL with negative HR HPV. Patient had mammogram today    Abnormal Bleeding Problem     Has had heavy periods for awhile(unsure of how long) would like to discuss starting contraception to help manage.        Initial /68   Ht 1.727 m (5' 8\")   Wt 73.2 kg (161 lb 6.4 oz)   LMP 2025 (Exact Date)   BMI 24.54 kg/m   Estimated body mass index is 24.54 kg/m  as calculated from the following:    Height as of this encounter: 1.727 m (5' 8\").    Weight as of this encounter: 73.2 kg (161 lb 6.4 oz).  BP completed using cuff size: regular    Questioned patient about current smoking habits.  Pt. has never smoked.          The following HM Due: pap smear    Marsha Kramer CMA               "

## 2025-02-05 NOTE — PATIENT INSTRUCTIONS
"\"I hope you had a positive experience and that you can definitely recommend [facility] to your family and friends. You ll be receiving a survey soon and I would look forward to hearing your feedback\".  "

## 2025-02-05 NOTE — PROGRESS NOTES
"IUD Insertion:  CONSULT:    Is a pregnancy test required: No.  Was a consent obtained?  Yes    Subjective: Ashlee Acevedo is a 44 year old  presents for IUD and desires Mirena type IUD.    Patient has been given the opportunity to ask questions about all forms of birth control, including all options appropriate for Ashlee Acevedo. Discussed that no method of birth control, except abstinence is 100% effective against pregnancy or sexually transmitted infection.     Ashlee Acevedo understands she may have the IUD removed at any time. IUD should be removed by a health care provider.    The entire insertion procedure was reviewed with the patient, including care after placement.    Patient's last menstrual period was 2025 (exact date). Last sexual activity: greater than 2 weeks . No allergy to betadine or shellfish. Patient declines STD screening  HCG Qual Urine   Date Value Ref Range Status   2019 Negative NEG^Negative Final     Comment:     This test is for screening purposes.  Results should be interpreted along with   the clinical picture.  Confirmation testing is available if warranted by   ordering [id number], HCG Quantitative Pregnancy.           /68   Ht 1.727 m (5' 8\")   Wt 73.2 kg (161 lb 6.4 oz)   LMP 2025 (Exact Date)   BMI 24.54 kg/m      Pelvic Exam:   EG/BUS: normal genital architecture without lesions, erythema or abnormal secretions.   Vagina: moist, pink, rugae with physiologic discharge and secretions  Cervix: ***parous no lesions and pink, moist, closed, without lesion or CMT  Uterus: ***position, mobile, no pain  Adnexa: within normal limits and no masses, nodularity, tenderness    PROCEDURE NOTE: -- IUD Insertion    Reason for Insertion: {IUD reasons:237732}    {IUD pain:962361}  Under sterile technique, cervix was visualized with speculum and prepped with {cleansing agents:249215} solution swab x 3. {IUD instruments:519995} was placed for stability. The uterus was " gently straightened and sounded to {IUD SOUNDING DEPTHS:255033} cm. IUD prepared for placement, and IUD inserted according to 's instructions without difficulty or significant resitance, and deployed at the fundus. The strings were visualized and trimmed to {IUD SOUNDING DEPTHS:067936} cm from the external os. Tenaculum was removed and hemostasis noted. Speculum removed.  Patient tolerated procedure well.    EBL: minimal    Complications: none    ASSESSMENT:     ICD-10-CM    1. Encounter for insertion of intrauterine contraceptive device  Z30.430 levonorgestrel (MIRENA) 52 MG (20 mcg/day) IUD 1 each     INSERTION INTRAUTERINE DEVICE           PLAN:    Given 's handouts, including when to have IUD removed, list of danger s/sx, side effects and follow up recommended. Encouraged condom use for prevention of STD. Back up contraception advised for 7 days if progestin method. Advised to call for any fever, for prolonged or severe pain or bleeding, abnormal vaginal discharge, or unable to palpate strings. She was advised to use pain medications (ibuprofen) as needed for mild to moderate pain. Advised to follow-up in clinic in 4-6 weeks for IUD string check if unable to find strings or as directed by provider.     OCTAVIO Patel CNM

## 2025-02-05 NOTE — PROGRESS NOTES
.Ashlee is a 44 year old  female who presents for annual exam.     Besides routine health maintenance   HPI:    Menses are regular q 28-30 days and normal, heavy, and regular lasting  4-5  days. Very heavy flow on the first day. Soaks through pads, pants and   Menses flow: normal and heavy. Heavy first day. Not as many clots or cramping anymore    Patient's last menstrual period was 2025 (exact date).   Using none for contraception.  Interested in an IUD to help with heavy menses  She is not currently considering pregnancy.    States having seen a Naturopath recently and several labs collected. Has a hx of anemia and takes a supplement. Waiting on iron study results. Also hx of hair loss, but TSH level was fine and since she started supplementing with iron the hair loss has improved.    REPRODUCTIVE/GYNECOLOGIC HISTORY:  Ashlee is sexually active with male partner(s) and is currently in monogamous relationship.   STI testing offered?  Declined  History of abnormal Pap smear:  Yes  SOCIAL HISTORY  Abuse: Does not report having previously been physical or sexually abused.    Do you feel safe in your environment? YES       Last PHQ-9 score on record =       3/28/2022     8:54 AM   PHQ-9 SCORE   PHQ-9 Total Score 0     Last GAD7 score on record =       3/28/2022     8:54 AM   SHAWNEE-7 SCORE   Total Score 0     HEALTH MAINTENANCE:      HEALTHY HABITS  Eating habits: eats regular meals, follows a balanced nutrition diet, and takes iron supplements. Takes Vitamin D supplement  Calcium/Vitamin D intake: source:  Dairy Adequate? Yes  Exercise: How often do you exercise? Walks daily and strength trains at [employer];Walking and Strength Training  Have you had an eye exam in the last two years? NO  Do you routinely see the dentist once or twice yearly? YES        HISTORY:  OB History    Para Term  AB Living   3 3 3 0 0 3   SAB IAB Ectopic Multiple Live Births   0 0 0 0 3      # Outcome Date GA Lbr  Reggie/2nd Weight Sex Type Anes PTL Lv   3 Term 18 39w1d / 00:19 3.29 kg (7 lb 4.1 oz) F Vag-Spont None N MATTHEW      Name: LANA DONAHUE      Apgar1: 8  Apgar5: 9   2 Term 16 37w6d 03:27 / 00:15 3.74 kg (8 lb 3.9 oz) F   N MATTHEW      Apgar1: 8  Apgar5: 9   1 Term 14 39w0d 07:15 / 05:12 3.1 kg (6 lb 13.4 oz) F Vag-Spont EPI  MATTHEW      Name: LANA DONAHUE M      Apgar1: 9  Apgar5: 9     Past Medical History:   Diagnosis Date    ASCUS with positive high risk HPV cervical 2016    ASCUS, +HR HPV. [place] in  [place] [facility]    History of cold sores     Orally only. Did have one on her breast as well.    HPV (human papilloma virus) infection     Threatened      Vaginitis      Past Surgical History:   Procedure Laterality Date    COLPOSCOPY CERVIX, BIOPSY CERVIX, ENDOCERVICAL CURETTAGE, COMBINED  2012    bx and ECG neg after initial LGSIL , + HPV    HC REMOVAL OF OVARIAN CYST(S)      LAPAROSCOPIC APPENDECTOMY CHILD  2000    wisdom teeth       Family History   Problem Relation Age of Onset    Hyperlipidemia Father     Parkinsonism Father     Hyperlipidemia Maternal Grandfather     Hyperlipidemia Paternal Grandmother     Hypertension Paternal Grandmother      Social History     Socioeconomic History    Marital status:      Spouse name: Ashley    Number of children: 1    Years of education: None    Highest education level: None   Tobacco Use    Smoking status: Never    Smokeless tobacco: Never   Substance and Sexual Activity    Alcohol use: Yes     Alcohol/week: 0.0 standard drinks of alcohol     Comment: 2 drinks per week    Drug use: Never    Sexual activity: Yes     Partners: Male     Birth control/protection: Condom       Current Outpatient Medications:     levonorgestrel (MIRENA) 52 MG (20 mcg/day) IUD, 1 each by Intrauterine route once., Disp: , Rfl:     Current Facility-Administered Medications:     levonorgestrel (MIRENA) 52 MG (20 mcg/day) IUD 1 each, 1 each, Intrauterine,  "See Admin Instructions, Rasheed Valorie Rg, APRN CNM, 1 each at 02/05/25 1135   No Known Allergies    Past medical, surgical, social and family history were reviewed and updated in EPIC.    ROS:   12 point review of systems negative other than symptoms noted below or in the HPI.    PHYSICAL EXAM:  /68   Ht 1.727 m (5' 8\")   Wt 73.2 kg (161 lb 6.4 oz)   LMP 01/16/2025 (Exact Date)   BMI 24.54 kg/m     BMI: Body mass index is 24.54 kg/m .  Constitutional: healthy, alert and no distress  Neck: symmetrical, thyroid normal size, no masses present, no lymphadenopathy present.   Cardiovascular: RRR, no murmurs present  Respiratory: breathing unlabored, lungs CTA bilaterally  Breast:normal without masses, tenderness or nipple discharge and no palpable axillary masses or adenopathy  Gastrointestinal: abdomen soft, non-tender, bowel sounds present  PELVIC EXAM:  Vulva: No lesions, no adenopathy, BUS WNL  Vagina: Moist, pink, discharge normal  well rugated, no lesions  Cervix:smooth, pink, no visible lesions  Uterus: Normal size, non-tender, mobile  Ovaries: No masses palpated  Rectal exam: Deferred    ASSESSMENT/PLAN:    ICD-10-CM    1. Mirena IUD inserted 02/05/2025  Z30.430 levonorgestrel (MIRENA) 52 MG (20 mcg/day) IUD 1 each     INSERTION INTRAUTERINE DEVICE    See MAR for device details      2. Screening for cervical cancer  Z12.4 HPV and Gynecologic Cytology Panel - Recommended Age 30-65 Years     [place] PELVIC EXAMINATION      3. IUD (intrauterine device) in place  Z97.5       4. Menorrhagia with regular cycle  N92.0         Results for orders placed or performed in visit on 02/05/25   MA Screen Bilateral w/Stuart     Status: Normal    Narrative    BILATERAL FULL FIELD DIGITAL SCREENING MAMMOGRAM WITH TOMOSYNTHESIS    Performed on: 2/5/25    No comparisons were made when reading this study.    Technique:  This study was evaluated with the assistance of Computer-Aided   Detection.  Breast Tomosynthesis was used in " "interpretation.    Findings: The breasts are heterogeneously dense, which may obscure small   masses.  There is no radiographic evidence of malignancy.     Impression    IMPRESSION: ACR BI-RADS Category 1: Negative    BREAST CANCER SCREENING RECOMMENDATION: Routine yearly mammography   beginning at age 40 or as discussed with your provider.    The results and recommendations of this examination will be communicated   to the patient.        Buck Nieto MD           COUNSELING:   Reviewed preventive health counseling, as reflected in patient instructions       Healthy diet/nutrition       Vision screening       Contraception       Folic Acid       Colorectal Cancer Screening  Pap collected, will MyChart with results  Discussed some changes that can occur in jj-menopause    IUD Insertion:  CONSULT:    Is a pregnancy test required: No.  Was a consent obtained?  Yes    Subjective: Ashlee Acevedo is a 44 year old  presents for IUD and desires Mirena type IUD.    Patient has been given the opportunity to ask questions about all forms of birth control, including all options appropriate for Ashlee Acevedo. Discussed that no method of birth control, except abstinence is 100% effective against pregnancy or sexually transmitted infection.     Ashlee Acevedo understands she may have the IUD removed at any time. IUD should be removed by a health care provider.    The entire insertion procedure was reviewed with the patient, including care after placement.    Patient's last menstrual period was 2025 (exact date). Last sexual activity: Greater than 2 weeks ago . No allergy to betadine or shellfish. Patient declines STD screening    Mirena IUD  NDC: 61850-922-97  Lot: [id number]  Exp:     /68   Ht 1.727 m (5' 8\")   Wt 73.2 kg (161 lb 6.4 oz)   LMP 2025 (Exact Date)   BMI 24.54 kg/m      Pelvic Exam:   EG/BUS: normal genital architecture without lesions, erythema or abnormal secretions. "   Vagina: moist, pink, rugae with physiologic discharge and secretions  Cervix: Parous no lesions and pink, moist, closed, without lesion or CMT  Uterus: Mobile, no pain  Adnexa: within normal limits and no masses, nodularity, tenderness    PROCEDURE NOTE: -- IUD Insertion    Reason for Insertion: contraception and and heavy menstrual bleeding    Under sterile technique, cervix was visualized with speculum and prepped with Betadine solution swab x 3. Tenaculum was placed for stability. The uterus was gently straightened and sounded to 7.0 cm. IUD prepared for placement, and IUD inserted according to 's instructions without difficulty or significant resitance, and deployed at the fundus. The strings were visualized and trimmed to 2.0 cm from the external os. Tenaculum was removed and hemostasis noted. Speculum removed.  Patient tolerated procedure well.    EBL: minimal    Complications: none    ASSESSMENT:     ICD-10-CM    1. Mirena IUD inserted 02/05/2025  Z30.430 levonorgestrel (MIRENA) 52 MG (20 mcg/day) IUD 1 each     INSERTION INTRAUTERINE DEVICE    See MAR for device details      2. Screening for cervical cancer  Z12.4 HPV and Gynecologic Cytology Panel - Recommended Age 30-65 Years     OR PELVIC EXAMINATION      3. IUD (intrauterine device) in place  Z97.5       4. Menorrhagia with regular cycle  N92.0              PLAN:    Given 's handouts, including when to have IUD removed, list of danger s/sx, side effects and follow up recommended. Encouraged condom use for prevention of STD. Back up contraception advised for 7 days if progestin method. Advised to call for any fever, for prolonged or severe pain or bleeding, abnormal vaginal discharge, or unable to palpate strings. She was advised to use pain medications (ibuprofen) as needed for mild to moderate pain. Advised to follow-up in clinic in 4-6 weeks for IUD string check if unable to find strings or as directed by provider.      Discussed if heavy periods continue after IUD placement then will recommend US evaluation      OCTAVIO PatelM

## 2025-02-06 LAB
HPV HR 12 DNA CVX QL NAA+PROBE: NEGATIVE
HPV16 DNA CVX QL NAA+PROBE: NEGATIVE
HPV18 DNA CVX QL NAA+PROBE: NEGATIVE
HUMAN PAPILLOMA VIRUS FINAL DIAGNOSIS: NORMAL

## 2025-02-10 LAB
BKR AP ASSOCIATED HPV REPORT: NORMAL
BKR LAB AP GYN ADEQUACY: NORMAL
BKR LAB AP GYN INTERPRETATION: NORMAL
BKR LAB AP LMP: NORMAL
BKR LAB AP PREVIOUS ABNORMAL: NORMAL
PATH REPORT.COMMENTS IMP SPEC: NORMAL
PATH REPORT.COMMENTS IMP SPEC: NORMAL
PATH REPORT.RELEVANT HX SPEC: NORMAL

## 2025-03-03 NOTE — PROGRESS NOTES
S: Ashlee Acevedo is a 44 year old  here for IUD check.  She denies problems with the IUD other than irregular spotting since placement.  She has not attempted to check for the strings.    O: LMP 01/16/2025 (Exact Date)   PELVIC EXAM:  Vulva: No external lesions, normal hair distribution, no adenopathy, BUS WNL  Vagina: Moist, pink, no abnormal discharge, well rugated, no lesions  Cervix:Smooth, pink, no visible lesions, neg CMT  IUD strings visualized and are extruding approximately 3 cm from cervical os    ASSESSMENT:  1) IUD normally placed.     PLAN:    RTC when due for annual exam or suspected problems with her IUD such as abnormal bleeding, disappearance of the strings or feeling the IUD in her cervix or with any pain with intercourse, or abnormal discharge.  If she starts having increased cramping with menses,  ibuprofen 600-800mg po TID with food can be used.  If she decides that she wants to attempt pregnancy in the future, she will schedule an appointment to have the IUD removed.     10 minutes spent by me on the date of the encounter doing chart review, patient visit, and documentation       Valorie CUNNINGHAM CNM

## 2025-03-04 ENCOUNTER — OFFICE VISIT (OUTPATIENT)
Dept: MIDWIFE SERVICES | Facility: CLINIC | Age: 45
End: 2025-03-04
Payer: COMMERCIAL

## 2025-03-04 VITALS
WEIGHT: 161 LBS | BODY MASS INDEX: 24.4 KG/M2 | SYSTOLIC BLOOD PRESSURE: 102 MMHG | DIASTOLIC BLOOD PRESSURE: 70 MMHG | HEIGHT: 68 IN

## 2025-03-04 DIAGNOSIS — Z97.5 IUD (INTRAUTERINE DEVICE) IN PLACE: Primary | ICD-10-CM

## 2025-03-04 DIAGNOSIS — Z30.431 IUD CHECK UP: ICD-10-CM

## 2025-03-04 PROCEDURE — 3078F DIAST BP <80 MM HG: CPT | Performed by: ADVANCED PRACTICE MIDWIFE

## 2025-03-04 PROCEDURE — 99459 PELVIC EXAMINATION: CPT | Performed by: ADVANCED PRACTICE MIDWIFE

## 2025-03-04 PROCEDURE — 99212 OFFICE O/P EST SF 10 MIN: CPT | Performed by: ADVANCED PRACTICE MIDWIFE

## 2025-03-04 PROCEDURE — 3074F SYST BP LT 130 MM HG: CPT | Performed by: ADVANCED PRACTICE MIDWIFE

## 2025-04-17 ENCOUNTER — TELEPHONE (OUTPATIENT)
Dept: MIDWIFE SERVICES | Facility: CLINIC | Age: 45
End: 2025-04-17
Payer: COMMERCIAL

## 2025-04-17 DIAGNOSIS — R10.31 RIGHT LOWER QUADRANT ABDOMINAL PAIN: Primary | ICD-10-CM

## 2025-04-17 NOTE — TELEPHONE ENCOUNTER
Patient called   Still experiencing quite a bit of right lower pain - unable to do a home UPT  Spotting has not changed; no heavy bleeding  Ibuprofen 400 mg taken about 3 hours ago  Did apply some heat.  Not helping and actually worse than when nurse spoke w her at 1310  Rating 6-10 right now.    NO visit openings yet today. Informed this to patient and ER precautions.    Will go down and talk w midwife on next step recommendations.    Sangeeta Moore RN on 4/17/2025 at 2:56 PM

## 2025-04-17 NOTE — TELEPHONE ENCOUNTER
2/5/25 Mirena IUD placement    New pain that started yesterday: right lower abdominal cramping/pain   Today the Pain was intense enough she took Ibuprofen 400 mg an hour ago for the first time since placement.  Spotting off and on since placement. Nothing heavy.  3/4/25 string check reassuring  Has not done a string check herself and has not been able to feel the strings in the past.    RN Instructed first of all to take a home UPT to r/o pregnancy r/t pain although mirena is very close to 100% effective to prevent pregnancy but just to r/o.  Continue home measures - tylenol and alternate w Ibuprofen and can even take 600 mg every 6 hours, add heat and/or warm tub bath/shower.    Did discuss can still ovulate and could be ovulatory discomfort. IF home measures do not improve the pain or worsens/more severe despite home care, be seen in ER.  Will route to midwives as well.    Sangeeta Moore RN on 4/17/2025 at 1:11 PM

## 2025-04-17 NOTE — TELEPHONE ENCOUNTER
RN spoke ramses Stephens CNM regarding pain hx.  Ok for early US and then phone visit w midwife in the afternoon giving enough time to have US read.  Scheduled 0800 US and 1400 phone visit tomorrow     RN instructed on visit options tomorrow and pt accepted US and phone visit.    RN instructed to continue Ibuprofen 600 mg every 6 hours, heat, tub bath or even ice. STILL do the home UPT. Present to the ER for +UPT OR if RLQ pain worsens/severe and unrelieved by home measures.    Pt verbalized understanding, in agreement with plan, and voiced no further questions.    Sangeeta Moore RN on 4/17/2025 at 3:12 PM

## 2025-04-18 ENCOUNTER — ANCILLARY PROCEDURE (OUTPATIENT)
Dept: ULTRASOUND IMAGING | Facility: CLINIC | Age: 45
End: 2025-04-18
Attending: NURSE PRACTITIONER
Payer: COMMERCIAL

## 2025-04-18 DIAGNOSIS — R10.31 RIGHT LOWER QUADRANT ABDOMINAL PAIN: ICD-10-CM

## 2025-04-18 PROCEDURE — 76830 TRANSVAGINAL US NON-OB: CPT | Performed by: OBSTETRICS & GYNECOLOGY

## 2025-04-18 PROCEDURE — 76377 3D RENDER W/INTRP POSTPROCES: CPT | Performed by: OBSTETRICS & GYNECOLOGY

## 2025-07-12 ENCOUNTER — HEALTH MAINTENANCE LETTER (OUTPATIENT)
Age: 45
End: 2025-07-12